# Patient Record
Sex: FEMALE | Race: WHITE | NOT HISPANIC OR LATINO
[De-identification: names, ages, dates, MRNs, and addresses within clinical notes are randomized per-mention and may not be internally consistent; named-entity substitution may affect disease eponyms.]

---

## 2022-02-16 PROBLEM — Z00.00 ENCOUNTER FOR PREVENTIVE HEALTH EXAMINATION: Status: ACTIVE | Noted: 2022-02-16

## 2022-02-22 ENCOUNTER — NON-APPOINTMENT (OUTPATIENT)
Age: 22
End: 2022-02-22

## 2022-02-22 ENCOUNTER — APPOINTMENT (OUTPATIENT)
Dept: COLORECTAL SURGERY | Facility: CLINIC | Age: 22
End: 2022-02-22
Payer: MEDICAID

## 2022-02-22 VITALS
SYSTOLIC BLOOD PRESSURE: 136 MMHG | HEIGHT: 63.5 IN | BODY MASS INDEX: 23.27 KG/M2 | HEART RATE: 93 BPM | WEIGHT: 133 LBS | TEMPERATURE: 98.5 F | DIASTOLIC BLOOD PRESSURE: 73 MMHG

## 2022-02-22 DIAGNOSIS — K58.9 IRRITABLE BOWEL SYNDROME W/OUT DIARRHEA: ICD-10-CM

## 2022-02-22 DIAGNOSIS — D64.9 ANEMIA, UNSPECIFIED: ICD-10-CM

## 2022-02-22 DIAGNOSIS — Z83.3 FAMILY HISTORY OF DIABETES MELLITUS: ICD-10-CM

## 2022-02-22 DIAGNOSIS — K63.5 POLYP OF COLON: ICD-10-CM

## 2022-02-22 DIAGNOSIS — Z86.59 PERSONAL HISTORY OF OTHER MENTAL AND BEHAVIORAL DISORDERS: ICD-10-CM

## 2022-02-22 PROCEDURE — 46600 DIAGNOSTIC ANOSCOPY SPX: CPT

## 2022-02-22 PROCEDURE — 99203 OFFICE O/P NEW LOW 30 MIN: CPT | Mod: 25

## 2022-02-22 NOTE — PHYSICAL EXAM
[FreeTextEntry1] : Medical assistant present for duration of physical examination\par \par General no acute distress, alert and oriented\par Psych calm, responding appropriately to questions\par Nonlabored breathing\par Ambulating without assistance\par \par Anorectal Exam:\par Inspection no erythema, induration or fluctuance, no skin excoriation, posterior midline anal fissure\par MOHINDER nontender, no masses palpated, no blood on gloved finger\par \par Procedure: Anoscopy\par \par Pre procedure Diagnosis: anal fissure\par Post procedure Diagnosis: anal fissure\par Anesthesia: none\par Estimated blood loss: none\par Specimen: none\par Complications: none\par \par Consent obtained. Anoscopy was performed by passing a lighted anoscope with lubricant jelly into the anal canal and the entire anal mucosal surface was inspected. Findings included posterior midline fissure, no internal hemorrhoidal inflammation, no blood or discharge in anal canal\par \par Patient tolerated examination and procedure well.\par \par \par

## 2022-02-22 NOTE — ASSESSMENT
[FreeTextEntry1] : Exam findings and diagnosis were discussed at length with patient. \par \par Patient presents today as a second opinion. Seen previously by a CRS who recommended botox injection.\par \par Importance of continued medical management and supportive care for constipation and pelvic floor dysfunction discussed.\par Management options for anal fissure discussed, including medical management and procedures/surgery.\par Symptom have persisted despite topical diltiazem compound. We discussed alternative topical compounds can be tried, and the possibility of persistent or worsening symptoms.\par We discussed alternative options, such as fissurectomy with botox injection vs lateral partial internal sphincterotomy. \par \par Recommend patient consider fissurectomy with botox injection.\par The nature of the procedure as well as risks and benefits were reviewed with the patient. Risk/benefits/alternatives discussed at length, including but not limited to pain, bleeding, infection, swelling, recurrence of symptoms, need for future surgery, scarring, and risk of alteration of bowel habits, such as seepage, fecal urgency and/or fecal (gas, liquid or solid) incontinence discussed, which may be temporary or permanent. Postprocedural expectations regarding pain, wound cosmesis, and wound healing discussed.  The preoperative, intraoperative, and postoperative management were discussed with the patient in detail. All questions were answered, patient expressed understanding, and would like to proceed with the proposed surgery. Informed consent was obtained. Ambulatory surgery instructions were given to patient\par \par Father was present for discussions at the request of the patient.\par

## 2022-02-22 NOTE — HISTORY OF PRESENT ILLNESS
[FreeTextEntry1] : 22 yo F presents for second opinion of anal fissure\par \par h/o IBS, chronic constipation, anxiety (h/o constipation on oral pill, now w/ Ketamine infusions and Latuda), pelvic floor dysfunction, fibromyalgia, chronic fatigue, generalized weakness (followed by Pain management and Rheum as needed, on Lyrica and meloxicam), followed by physiatrist for pelvic floor dysfunction, s/p PT for over 1 year w/o improvement, nausea (on odansetron, gas x)\par PSH umbilical hernia repair age 7\par h/o anemia s/p iron infusions x 2, last received this past week\par \par h/o chronic constipation x a few years w/ h/o BM every 1-2 weeks w/o laxatives, currently on Miralax 3 servings daily and Linzess w/ BM every 2-3 days and loose. Pt takes Dulcolax as needed, however tries to avoid as she has urgency and sometimes can't make it to the toilet in time\par h/o rectal bleeding every several months in past years\par \par c/o onset of rectal pain 7 weeks ago in setting of constipation and straining w/ sharp cut like pain, worsened after BMs and prolonged sitting. Also has occasional BRB noted in toilet bowl\par \par Tried topical lidocaine 4 % w/o much improvement\par Pt c/o pain to physiatrist who suspected fissure and advised to contact GI. GI started on topical Diltiazem 2%, pt has been using BID for the last 5.5 weeks w/o much improvement\par \par Also seen by JESSICA Balbuena at [facility] who confirmed fissure and recommended Botox\par Pt concerned about risk of sphincter loss of control and presents for alternate opinion\par \par Has tried dietary fiber in the past w/ worsening constipation and nausea, followed by Nutritionist and recommends avoidance of greens. Eats mostly nutritional shakes, soups, pureed foods.\par adequate dietary fiber intake, drinks water. \par \par \par Denies FMH of colorectal cancer. Sister w/ Crohn's disease\par Last colonoscopy 12/28/21 w/ Dr. Vasiliy Cantor at [facility], internal hemorrhoids and 1 hyperplastic polyp noted\par EGD mild gastitis on protonix\par Denies ASA use. takes Meloxicam

## 2022-02-23 ENCOUNTER — TRANSCRIPTION ENCOUNTER (OUTPATIENT)
Age: 22
End: 2022-02-23

## 2022-03-24 ENCOUNTER — TRANSCRIPTION ENCOUNTER (OUTPATIENT)
Age: 22
End: 2022-03-24

## 2022-03-24 NOTE — ASU PATIENT PROFILE, ADULT - NSICDXPASTMEDICALHX_GEN_ALL_CORE_FT
PAST MEDICAL HISTORY:  Acid reflux     Amplified musculoskeletal pain syndrome     Anemia     Asthma     Constipation     H/O fibromyalgia     H/O headache     History of nausea     IBS (irritable bowel syndrome)     Rosacea     Severe anxiety      PAST MEDICAL HISTORY:  Acid reflux     Amplified musculoskeletal pain syndrome     Anemia     Arthralgia of both knees     Asthma     Constipation     H/O fibromyalgia     H/O headache     History of nausea     IBS (irritable bowel syndrome)     Rosacea     Severe anxiety

## 2022-03-24 NOTE — ASU PATIENT PROFILE, ADULT - NSICDXPASTSURGICALHX_GEN_ALL_CORE_FT
PAST SURGICAL HISTORY:  H/O hernia repair     S/P biopsy face    S/P colonoscopy     S/P endoscopy      PAST SURGICAL HISTORY:  H/O hernia repair umbilical    S/P biopsy face    S/P colonoscopy     S/P endoscopy

## 2022-03-25 ENCOUNTER — TRANSCRIPTION ENCOUNTER (OUTPATIENT)
Age: 22
End: 2022-03-25

## 2022-03-25 ENCOUNTER — APPOINTMENT (OUTPATIENT)
Dept: COLORECTAL SURGERY | Facility: CLINIC | Age: 22
End: 2022-03-25

## 2022-03-25 ENCOUNTER — OUTPATIENT (OUTPATIENT)
Dept: OUTPATIENT SERVICES | Facility: HOSPITAL | Age: 22
LOS: 1 days | Discharge: HOME | End: 2022-03-25
Payer: MEDICAID

## 2022-03-25 ENCOUNTER — RESULT REVIEW (OUTPATIENT)
Age: 22
End: 2022-03-25

## 2022-03-25 VITALS
HEIGHT: 63.5 IN | TEMPERATURE: 98 F | RESPIRATION RATE: 16 BRPM | DIASTOLIC BLOOD PRESSURE: 76 MMHG | WEIGHT: 128.53 LBS | HEART RATE: 83 BPM | SYSTOLIC BLOOD PRESSURE: 124 MMHG

## 2022-03-25 VITALS
HEART RATE: 72 BPM | RESPIRATION RATE: 16 BRPM | SYSTOLIC BLOOD PRESSURE: 118 MMHG | TEMPERATURE: 98 F | DIASTOLIC BLOOD PRESSURE: 65 MMHG

## 2022-03-25 DIAGNOSIS — Z98.890 OTHER SPECIFIED POSTPROCEDURAL STATES: Chronic | ICD-10-CM

## 2022-03-25 PROCEDURE — 88304 TISSUE EXAM BY PATHOLOGIST: CPT | Mod: 26

## 2022-03-25 PROCEDURE — 46200 REMOVAL OF ANAL FISSURE: CPT | Mod: GC

## 2022-03-25 PROCEDURE — 46505 CHEMODENERVATION ANAL MUSC: CPT | Mod: GC

## 2022-03-25 RX ORDER — SODIUM CHLORIDE 9 MG/ML
1000 INJECTION, SOLUTION INTRAVENOUS
Refills: 0 | Status: DISCONTINUED | OUTPATIENT
Start: 2022-03-25 | End: 2022-03-25

## 2022-03-25 RX ORDER — OXYCODONE AND ACETAMINOPHEN 5; 325 MG/1; MG/1
1 TABLET ORAL
Qty: 15 | Refills: 0
Start: 2022-03-25

## 2022-03-25 RX ORDER — DOCUSATE SODIUM 100 MG
1 CAPSULE ORAL
Qty: 7 | Refills: 0
Start: 2022-03-25 | End: 2022-03-31

## 2022-03-25 RX ORDER — OXYCODONE HYDROCHLORIDE 5 MG/1
5 TABLET ORAL ONCE
Refills: 0 | Status: DISCONTINUED | OUTPATIENT
Start: 2022-03-25 | End: 2022-03-25

## 2022-03-25 NOTE — ASU DISCHARGE PLAN (ADULT/PEDIATRIC) - NS MD DC FALL RISK RISK
For information on Fall & Injury Prevention, visit: [email or web address] OR  [email or web address] OR  https://www.cdc.gov/steadi/patient.html

## 2022-03-25 NOTE — BRIEF OPERATIVE NOTE - NSICDXBRIEFPROCEDURE_GEN_ALL_CORE_FT
PROCEDURES:  Excision, fissure or fistula, anus 25-Mar-2022 06:56:36  Rishi Holguin  Injection of Botox into anus 25-Mar-2022 06:56:43  Rishi Holguin

## 2022-03-25 NOTE — ASU DISCHARGE PLAN (ADULT/PEDIATRIC) - CARE PROVIDER_API CALL
Marquita Marquez)  ColonRectal Surgery; Surgery  [address] [facility]nd [address]  [place], [place] [address]  Phone: [phone]  Fax: [phone]  Follow Up Time: 1 month

## 2022-03-25 NOTE — BRIEF OPERATIVE NOTE - OPERATION/FINDINGS
INCOMPLETE Perianal block. Excision of fissure with sharp dissection. Hemostasis with bipolar cautery. Curreatage of fissure base. Botox injected around and within fissure bed. Excellent hemostasis.

## 2022-03-25 NOTE — ASU DISCHARGE PLAN (ADULT/PEDIATRIC) - ASU DC SPECIAL INSTRUCTIONSFT
-Please take Colace (Docusate) 100 mg twice a day. Take Percocet if needed for pain.  -Please perform Sitz baths (no Epsom salts or additional additives), ~20 minutes two or three times a day and after bowel movements.  -Avoiding straining with BM’s and maintaining adequate hydration and fiber intake  -Drink 6-10 glasses of water daily and eat a mix of fruits, vegetables and whole grains  -Can take fiber supplement in addition to dietary fiber but only provides 1/3 (10mg) of needed daily intake  -Please call the office to schedule an appointment 3-4 weeks after surgery. The office number is listed below.

## 2022-03-27 ENCOUNTER — EMERGENCY (EMERGENCY)
Facility: HOSPITAL | Age: 22
LOS: 1 days | Discharge: HOME | End: 2022-03-27
Attending: EMERGENCY MEDICINE | Admitting: EMERGENCY MEDICINE
Payer: COMMERCIAL

## 2022-03-27 VITALS
SYSTOLIC BLOOD PRESSURE: 131 MMHG | DIASTOLIC BLOOD PRESSURE: 84 MMHG | RESPIRATION RATE: 18 BRPM | HEIGHT: 63.5 IN | OXYGEN SATURATION: 98 % | HEART RATE: 94 BPM | TEMPERATURE: 99 F | WEIGHT: 130.07 LBS

## 2022-03-27 DIAGNOSIS — Z98.890 OTHER SPECIFIED POSTPROCEDURAL STATES: Chronic | ICD-10-CM

## 2022-03-27 DIAGNOSIS — R33.9 RETENTION OF URINE, UNSPECIFIED: ICD-10-CM

## 2022-03-27 PROBLEM — F41.9 ANXIETY DISORDER, UNSPECIFIED: Chronic | Status: ACTIVE | Noted: 2022-03-24

## 2022-03-27 PROBLEM — J45.909 UNSPECIFIED ASTHMA, UNCOMPLICATED: Chronic | Status: ACTIVE | Noted: 2022-03-24

## 2022-03-27 PROBLEM — M25.561 PAIN IN RIGHT KNEE: Chronic | Status: ACTIVE | Noted: 2022-03-25

## 2022-03-27 PROBLEM — Z87.898 PERSONAL HISTORY OF OTHER SPECIFIED CONDITIONS: Chronic | Status: ACTIVE | Noted: 2022-03-24

## 2022-03-27 PROBLEM — K58.9 IRRITABLE BOWEL SYNDROME WITHOUT DIARRHEA: Chronic | Status: ACTIVE | Noted: 2022-03-24

## 2022-03-27 PROBLEM — Z87.39 PERSONAL HISTORY OF OTHER DISEASES OF THE MUSCULOSKELETAL SYSTEM AND CONNECTIVE TISSUE: Chronic | Status: ACTIVE | Noted: 2022-03-24

## 2022-03-27 PROBLEM — M79.18 MYALGIA, OTHER SITE: Chronic | Status: ACTIVE | Noted: 2022-03-24

## 2022-03-27 PROBLEM — K59.00 CONSTIPATION, UNSPECIFIED: Chronic | Status: ACTIVE | Noted: 2022-03-24

## 2022-03-27 PROBLEM — K21.9 GASTRO-ESOPHAGEAL REFLUX DISEASE WITHOUT ESOPHAGITIS: Chronic | Status: ACTIVE | Noted: 2022-03-24

## 2022-03-27 PROBLEM — L71.9 ROSACEA, UNSPECIFIED: Chronic | Status: ACTIVE | Noted: 2022-03-24

## 2022-03-27 PROBLEM — K58.9 IRRITABLE BOWEL SYNDROME, UNSPECIFIED: Chronic | Status: ACTIVE | Noted: 2022-03-24

## 2022-03-27 PROBLEM — D64.9 ANEMIA, UNSPECIFIED: Chronic | Status: ACTIVE | Noted: 2022-03-24

## 2022-03-27 LAB
ALBUMIN SERPL ELPH-MCNC: 5 G/DL — SIGNIFICANT CHANGE UP (ref 3.3–5)
ALP SERPL-CCNC: 43 U/L — SIGNIFICANT CHANGE UP (ref 40–120)
ALT FLD-CCNC: 11 U/L — SIGNIFICANT CHANGE UP (ref 10–45)
ANION GAP SERPL CALC-SCNC: 11 MMOL/L — SIGNIFICANT CHANGE UP (ref 5–17)
APPEARANCE UR: CLEAR — SIGNIFICANT CHANGE UP
AST SERPL-CCNC: 16 U/L — SIGNIFICANT CHANGE UP (ref 10–40)
BILIRUB SERPL-MCNC: 0.4 MG/DL — SIGNIFICANT CHANGE UP (ref 0.2–1.2)
BILIRUB UR-MCNC: NEGATIVE — SIGNIFICANT CHANGE UP
BUN SERPL-MCNC: 9 MG/DL — SIGNIFICANT CHANGE UP (ref 7–23)
CALCIUM SERPL-MCNC: 9.6 MG/DL — SIGNIFICANT CHANGE UP (ref 8.4–10.5)
CHLORIDE SERPL-SCNC: 104 MMOL/L — SIGNIFICANT CHANGE UP (ref 96–108)
CO2 SERPL-SCNC: 27 MMOL/L — SIGNIFICANT CHANGE UP (ref 22–31)
COLOR SPEC: YELLOW — SIGNIFICANT CHANGE UP
CREAT SERPL-MCNC: 0.85 MG/DL — SIGNIFICANT CHANGE UP (ref 0.5–1.3)
DIFF PNL FLD: NEGATIVE — SIGNIFICANT CHANGE UP
EGFR: 99 ML/MIN/1.73M2 — SIGNIFICANT CHANGE UP
GLUCOSE SERPL-MCNC: 93 MG/DL — SIGNIFICANT CHANGE UP (ref 70–99)
GLUCOSE UR QL: NEGATIVE — SIGNIFICANT CHANGE UP
HCT VFR BLD CALC: 44.6 % — SIGNIFICANT CHANGE UP (ref 34.5–45)
HGB BLD-MCNC: 14.5 G/DL — SIGNIFICANT CHANGE UP (ref 11.5–15.5)
KETONES UR-MCNC: NEGATIVE — SIGNIFICANT CHANGE UP
LEUKOCYTE ESTERASE UR-ACNC: NEGATIVE — SIGNIFICANT CHANGE UP
MCHC RBC-ENTMCNC: 29.4 PG — SIGNIFICANT CHANGE UP (ref 27–34)
MCHC RBC-ENTMCNC: 32.5 GM/DL — SIGNIFICANT CHANGE UP (ref 32–36)
MCV RBC AUTO: 90.3 FL — SIGNIFICANT CHANGE UP (ref 80–100)
NITRITE UR-MCNC: NEGATIVE — SIGNIFICANT CHANGE UP
NRBC # BLD: 0 /100 WBCS — SIGNIFICANT CHANGE UP (ref 0–0)
PH UR: 8 — SIGNIFICANT CHANGE UP (ref 5–8)
PLATELET # BLD AUTO: 291 K/UL — SIGNIFICANT CHANGE UP (ref 150–400)
POTASSIUM SERPL-MCNC: 4.1 MMOL/L — SIGNIFICANT CHANGE UP (ref 3.5–5.3)
POTASSIUM SERPL-SCNC: 4.1 MMOL/L — SIGNIFICANT CHANGE UP (ref 3.5–5.3)
PROT SERPL-MCNC: 7.5 G/DL — SIGNIFICANT CHANGE UP (ref 6–8.3)
PROT UR-MCNC: NEGATIVE MG/DL — SIGNIFICANT CHANGE UP
RBC # BLD: 4.94 M/UL — SIGNIFICANT CHANGE UP (ref 3.8–5.2)
RBC # FLD: 12.7 % — SIGNIFICANT CHANGE UP (ref 10.3–14.5)
SODIUM SERPL-SCNC: 142 MMOL/L — SIGNIFICANT CHANGE UP (ref 135–145)
SP GR SPEC: 1.01 — SIGNIFICANT CHANGE UP (ref 1–1.03)
UROBILINOGEN FLD QL: 0.2 E.U./DL — SIGNIFICANT CHANGE UP
WBC # BLD: 5.81 K/UL — SIGNIFICANT CHANGE UP (ref 3.8–10.5)
WBC # FLD AUTO: 5.81 K/UL — SIGNIFICANT CHANGE UP (ref 3.8–10.5)

## 2022-03-27 PROCEDURE — 85027 COMPLETE CBC AUTOMATED: CPT

## 2022-03-27 PROCEDURE — 51702 INSERT TEMP BLADDER CATH: CPT

## 2022-03-27 PROCEDURE — 99284 EMERGENCY DEPT VISIT MOD MDM: CPT | Mod: 25

## 2022-03-27 PROCEDURE — 74177 CT ABD & PELVIS W/CONTRAST: CPT | Mod: 26,MA

## 2022-03-27 PROCEDURE — 88304 TISSUE EXAM BY PATHOLOGIST: CPT

## 2022-03-27 PROCEDURE — 96365 THER/PROPH/DIAG IV INF INIT: CPT | Mod: XU

## 2022-03-27 PROCEDURE — 99285 EMERGENCY DEPT VISIT HI MDM: CPT

## 2022-03-27 PROCEDURE — 81003 URINALYSIS AUTO W/O SCOPE: CPT

## 2022-03-27 PROCEDURE — 80053 COMPREHEN METABOLIC PANEL: CPT

## 2022-03-27 PROCEDURE — 74177 CT ABD & PELVIS W/CONTRAST: CPT | Mod: MA

## 2022-03-27 PROCEDURE — 36415 COLL VENOUS BLD VENIPUNCTURE: CPT

## 2022-03-27 RX ORDER — ACETAMINOPHEN 500 MG
1000 TABLET ORAL ONCE
Refills: 0 | Status: COMPLETED | OUTPATIENT
Start: 2022-03-27 | End: 2022-03-27

## 2022-03-27 RX ORDER — OXYBUTYNIN CHLORIDE 5 MG
5 TABLET ORAL ONCE
Refills: 0 | Status: COMPLETED | OUTPATIENT
Start: 2022-03-27 | End: 2022-03-27

## 2022-03-27 RX ADMIN — Medication 1000 MILLIGRAM(S): at 14:00

## 2022-03-27 RX ADMIN — Medication 400 MILLIGRAM(S): at 13:39

## 2022-03-27 RX ADMIN — Medication 5 MILLIGRAM(S): at 14:56

## 2022-03-27 NOTE — ED ADULT NURSE NOTE - NSICDXPASTMEDICALHX_GEN_ALL_CORE_FT
PAST MEDICAL HISTORY:  Acid reflux     Amplified musculoskeletal pain syndrome     Anemia     Arthralgia of both knees     Asthma     Constipation     H/O fibromyalgia     H/O headache     History of nausea     IBS (irritable bowel syndrome)     Rosacea     Severe anxiety

## 2022-03-27 NOTE — ED PROVIDER NOTE - CLINICAL SUMMARY MEDICAL DECISION MAKING FREE TEXT BOX
Patient presents to ED with concern for rectal discomfort and pain to site of chung catheter following anal fissure repair with Dr. Marquez 3/25.  Patient non toxic, in NAD on arrival to ED.  Chung draining yellow, clear urine.  Abd soft. NT/ND.  Labs sent and surgery team consulted to evaluate patient. Patient presents to ED with concern for rectal discomfort and pain to site of chung catheter following anal fissure repair with Dr. Marquez 3/25.  Patient non toxic, in NAD on arrival to ED.  Chung draining yellow, clear urine.  Abd soft. NT/ND.  Labs sent and surgery team consulted to evaluate patient.  Gen sx requesting CT abd/pel w IV contrast and trial chung removal with single dose of oxybutynin.  CT and labs WNL.  Patient is aware of all results.  Chung removed and patient unable to void following several hour wait / IVF, po fluid, etc.  Urology team contacted recommending replacement of chung and outpatient urology follow up this week.  Chung is replaced and patient is aware of recommendation for follow up with both general surgery and urology teams.  Patient is advised to follow up with J Luis Marquez and Evangelista (urologist, information provided) in 1-2 days without fail.  Patient instructed to return to ED immediately should their symptoms worsen or if there is any concern prior to the recommended follow up.  Patient is aware of plan and verbalizes their understanding.  Will discharge home at this time.

## 2022-03-27 NOTE — CONSULT NOTE ADULT - SUBJECTIVE AND OBJECTIVE BOX
This is a 21 year old female with history of an anal fissure, she is s/p a  recent fissure repair with Botox injections.  She  presented  to ED with concern of rectal pain and discomfort from a Pascal catheter that was placed yesterday in another ER ([facility] in [place]) that she had gone to after experiencing urinary retention post op.   On ED arrival,  the patients Pascal was draining clear yellow urine.  She denied any associated fever, chills, headache, visual changes, chest pain, shortness of breath, abdominal pain, nausea, emesis, changes to bowel movements, peripheral edema, calf pain/tenderness, recent travel, known sick contacts or any additional acute complaints or concerns at this time.  Patient states she has been taking Lyrica as prescribed for her history of chronic pain.    Once in the ER her Pascal was removed and she was unable to void again.  Staff attempted but was unable to place a new Pascal, so Urology was called    PAST MEDICAL & SURGICAL HISTORY:  Severe anxiety  H/O headache  Asthma  Acid reflux  H/O fibromyalgia  Anemia  Constipation  History of nausea  IBS (irritable bowel syndrome)  Rosacea  Amplified musculoskeletal pain syndrome  Arthralgia of both knees  H/O hernia repair  umbilical  S/P endoscopy  S/P colonoscopy  S/P biopsy face    Home Medications:  albuterol: 2.5 milligram(s) by nebulizer prn (25 Mar 2022 07:40)  azelaic acid 15% topical gel: Apply topically to affected area 2 times a day (25 Mar 2022 07:40)  cyproheptadine 4 mg oral tablet: 4 milligram(s) orally 2 times a day (25 Mar 2022 07:40)  diazePAM 5 mg rectal kit: 5 milligram(s) rectal prn (25 Mar 2022 07:40)  ketamine: 1 implant(s) intravenous every 3 months (25 Mar 2022 07:40)  Latuda 20 mg oral tablet: 1 tab(s) orally once a day (25 Mar 2022 07:40)  Linzess 290 mcg oral capsule: 1 cap(s) orally once a day (25 Mar 2022 07:40)  Lyrica 300 mg oral capsule: 300 milligram(s) orally 3 times a day (25 Mar 2022 07:40)  meloxicam 15 mg oral tablet: 1 tab(s) orally once a day (25 Mar 2022 07:40)  MiraLax: 17 gram(s) orally 3 times a day (25 Mar 2022 07:40)  pantoprazole 40 mg oral delayed release tablet: 1 tab(s) orally once a day (25 Mar 2022 07:40)  simethicone 250 mg oral capsule: 250 milligram(s) orally 2 times a day (25 Mar 2022 07:40)  Xanax 0.5 mg oral tablet: 0.5 milligram(s) orally prn (25 Mar 2022 07:40)  Zofran ODT 8 mg oral tablet, disintegratin tab(s) orally 3 times a day (25 Mar 2022 07:40)                          14.5   5.81  )-----------( 291      ( 27 Mar 2022 13:34 )             44.6   03-27    142  |  104  |  9   ----------------------------<  93  4.1   |  27  |  0.85    Ca    9.6      27 Mar 2022 13:34    TPro  7.5  /  Alb  5.0  /  TBili  0.4  /  DBili  x   /  AST  16  /  ALT  11  /  AlkPhos  43  03-    Urinalysis Basic - ( 27 Mar 2022 12:57 )    Color: Yellow / Appearance: Clear / S.010 / pH: x  Gluc: x / Ketone: NEGATIVE  / Bili: Negative / Urobili: 0.2 E.U./dL   Blood: x / Protein: NEGATIVE mg/dL / Nitrite: NEGATIVE   Leuk Esterase: NEGATIVE / RBC: x / WBC x   Sq Epi: x / Non Sq Epi: x / Bacteria: x

## 2022-03-27 NOTE — ED PROVIDER NOTE - NSFOLLOWUPINSTRUCTIONS_ED_ALL_ED_FT
Please follow up with Dr. Marquez (general surgeon) and Dr. Naidu (urologist, information provided to you) in 1-2 days for re evaluation.  Please return to ER immediately should your symptoms worsen or if you have any concern prior to this recommended follow up.      Pascal Catheter Placement and Care    WHAT YOU NEED TO KNOW:    A Pascal catheter is a sterile tube that is inserted into your bladder to drain urine. It is also called an indwelling urinary catheter.     DISCHARGE INSTRUCTIONS:    Return to the emergency department if:   •Your catheter comes out.       •You suddenly have material that looks like sand in the tubing or drainage bag.      •No urine is draining into the bag and you have checked the system.      •You have pain in your hip, back, pelvis, or lower abdomen.      •You are confused or cannot think clearly.      Call your doctor or urologist if:   •You have a fever.      •You have bladder spasms for more than 1 day after the catheter is placed.      •You see blood in the tubing or drainage bag.      •You have a rash or itching where the catheter tube is secured to your skin.      •Urine leaks from or around the catheter, tubing, or drainage bag.      •The closed drainage system has accidently come open or apart.       •You see a layer of crystals inside the tubing.      •You have questions or concerns about your condition or care.      Care for your catheter and drainage bag: You can reduce your risk for infection and injury by caring for your catheter and drainage bag properly.  •Wash your hands often. Wash before and after you touch your catheter, tubing, or drainage bag. Use soap and water. Wear clean disposable gloves when you care for your catheter or disconnect the drainage bag. Wash your hands before you prepare or eat food.   Handwashing           •Clean your genital area 2 times every day. Clean your catheter area and anal opening after every bowel movement. ?For men: Use a soapy cloth to clean the tip of your penis. Start where the catheter enters. Wipe backward making sure to pull back the foreskin. Then use a cloth with clear water in the same direction to clean away the soap.       ?For women: Use a soapy cloth to clean the area that the catheter enters your body. Make sure to separate your labia and wipe toward the anus. Then use a cloth with clear water and wipe in the same direction.       •Secure the catheter tube so you do not pull or move the catheter. This helps prevent pain and bladder spasms. Healthcare providers will show you how to use medical tape or a strap to secure the catheter tube to your body.       •Keep a closed drainage system. Your catheter should always be attached to the drainage bag to form a closed system. Do not disconnect any part of the closed system unless you need to change the bag.      •Keep the drainage bag below the level of your waist. This helps stop urine from moving back up the tubing and into your bladder. Do not loop or kink the tubing. This can cause urine to back up and collect in your bladder. Do not let the drainage bag touch or lie on the floor.      •Empty the drainage bag when needed. The weight of a full drainage bag can be painful. Empty the drainage bag every 3 to 6 hours or when it is ? full.       •Clean and change the drainage bag as directed. Ask your healthcare provider how often you should change the drainage bag and what cleaning solution to use. Wear disposable gloves when you change the bag. Do not allow the end of the catheter or tubing to touch anything. Clean the ends with an alcohol pad before you reconnect them.      What to do if problems develop:   •No urine is draining into the bag: ?Check for kinks in the tubing and straighten them out.       ?Check the tape or strap used to secure the catheter tube to your skin. Make sure it is not blocking the tube.       ?Make sure you are not sitting or lying on the tubing.      ?Make sure the urine bag is hanging below the level of your waist.      •Urine leaks from or around the catheter, tubing, or drainage bag: Check if the closed drainage system has accidently come open or apart. Clean the catheter and tubing ends with a new alcohol pad and reconnect them.       Follow up with your doctor or urologist as directed: Write down your questions so you remember to ask them during your visits.        © Copyright [employer] 2022           back to top                          © Copyright [employer] 2022

## 2022-03-27 NOTE — ED ADULT TRIAGE NOTE - OTHER COMPLAINTS
s/p indwelling urinary cath placed on friday after botox surgery endorsing pain to site with burning.

## 2022-03-27 NOTE — ED PROVIDER NOTE - PHYSICAL EXAMINATION
VITAL SIGNS: I have reviewed nursing notes and confirm.  CONSTITUTIONAL: Non toxic; in no acute distress.   SKIN:  Warm and dry, no acute rash.   HEAD:  Normocephalic, atraumatic.  EYES: PERRL.  EOM intact; conjunctiva and sclera clear.  ENT: No nasal discharge; airway clear.   NECK: Supple; non tender.  CARD: S1, S2 normal; no murmurs, gallops, or rubs. Regular rate and rhythm.   RESP:  Clear to auscultation b/l, no wheezes, rales or rhonchi.  ABD: Normal bowel sounds; soft; non-distended; non-tender; no guarding/rebound.  + External, non thrombosed hemorrhoid, no bleeding.  :  Pascal catheter in place draining clear yellow urine.    EXT: Normal ROM. No clubbing, cyanosis or edema. 2+ pulses to b/l ue/le.  NEURO: Alert, oriented, grossly unremarkable.  5/5 strength x 4 extremities against gravity and external force.  No drift x 4 extremities.  Sensation intact and symmetric x 4 extremities.  No facial asymmetry.    PSYCH: Cooperative, mood and affect appropriate.

## 2022-03-27 NOTE — ED ADULT NURSE NOTE - NSICDXPASTSURGICALHX_GEN_ALL_CORE_FT
PAST SURGICAL HISTORY:  H/O hernia repair umbilical    S/P biopsy face    S/P colonoscopy     S/P endoscopy

## 2022-03-27 NOTE — ED PROVIDER NOTE - CARE PROVIDER_API CALL
Miguelito Naidu)  Urology  [address] [address]  Phone: [phone]  Fax: [phone]  Follow Up Time:     Marquita Marquez)  ColonRectal Surgery; Surgery  [address] [facility] [place] [address]  Phone: [phone]  Fax: [phone]  Follow Up Time:

## 2022-03-27 NOTE — CONSULT NOTE ADULT - SUBJECTIVE AND OBJECTIVE BOX
HPI: 21 year old female with history of anal fissure s/p recent fissure repair and botox injection presents to ED with concern for rectal pain and discomfort to site of chung catheter.  Patient states she was having some urinary retention following her surgery 3/25 was sent to another area ED where she had a chung catheter placed.  On ED arrival, patient's chung is draining clear yellow urine.  Patient denies associated fever, chills, headache, visual changes, chest pain, shortness of breath, abdominal pain, nausea, emesis, changes to bowel movements, peripheral edema, calf pain/tenderness, recent travel, known sick contacts or any additional acute complaints or concerns at this time.  Patient states she has been taking Lyrica as prescribed for her history of chronic pain.      SURGICAL ADDENDUM: 20yo F pt s/p fissurectomy and botox injections on 3/25 with Dr. Marquez presents with rectal pain x1 day. Reports pain has become more severe today but is more concerned with discomfort associated with chung catheter placed at [facility] ED on 3/26. Surgery consulted for rectal pain. Pt endorsing moderate rectal pain and trace blood with bowel movement last night. Also states that her BM was hard with difficulty voiding.     PAST MEDICAL & SURGICAL HISTORY:  Severe anxiety    H/O headache    Asthma    Acid reflux    H/O fibromyalgia    Anemia    Constipation    History of nausea    IBS (irritable bowel syndrome)    Rosacea    Amplified musculoskeletal pain syndrome    Arthralgia of both knees    H/O hernia repair  umbilical    S/P endoscopy    S/P colonoscopy    S/P biopsy  face        MEDICATIONS  (STANDING):    MEDICATIONS  (PRN):      Allergies    amitriptyline (Other)  ceftriaxone (Rash)  clindamycin (Vomiting)  doxycycline (Vomiting; Nausea)  fish (Nausea)  fluoxetine (Other)  lactose (Other)  Lamictal (Rash)  Seroquel (Unknown)  sertraline (Other)  Vraylar (Vomiting)    Intolerances        SOCIAL HISTORY:    FAMILY HISTORY:      Vital Signs Last 24 Hrs  T(C): 37.3 (27 Mar 2022 12:17), Max: 37.3 (27 Mar 2022 12:17)  T(F): 99.1 (27 Mar 2022 12:17), Max: 99.1 (27 Mar 2022 12:17)  HR: 94 (27 Mar 2022 12:17) (94 - 94)  BP: 131/84 (27 Mar 2022 12:17) (131/84 - 131/84)  BP(mean): --  RR: 18 (27 Mar 2022 12:17) (18 - 18)  SpO2: 98% (27 Mar 2022 12:17) (98% - 98%)    PHYSICAL EXAM      LABS:                        14.5   5.81  )-----------( 291      ( 27 Mar 2022 13:34 )             44.6     03    142  |  104  |  9   ----------------------------<  93  4.1   |  27  |  0.85    Ca    9.6      27 Mar 2022 13:34    TPro  7.5  /  Alb  5.0  /  TBili  0.4  /  DBili  x   /  AST  16  /  ALT  11  /  AlkPhos  43        Urinalysis Basic - ( 27 Mar 2022 12:57 )    Color: Yellow / Appearance: Clear / S.010 / pH: x  Gluc: x / Ketone: NEGATIVE  / Bili: Negative / Urobili: 0.2 E.U./dL   Blood: x / Protein: NEGATIVE mg/dL / Nitrite: NEGATIVE   Leuk Esterase: NEGATIVE / RBC: x / WBC x   Sq Epi: x / Non Sq Epi: x / Bacteria: x        RADIOLOGY & ADDITIONAL STUDIES: HPI: 21 year old female with history of anal fissure s/p recent fissure repair and botox injection presents to ED with concern for rectal pain and discomfort to site of chung catheter.  Patient states she was having some urinary retention following her surgery 3/25 was sent to another area ED where she had a chung catheter placed.  On ED arrival, patient's chung is draining clear yellow urine.  Patient denies associated fever, chills, headache, visual changes, chest pain, shortness of breath, abdominal pain, nausea, emesis, changes to bowel movements, peripheral edema, calf pain/tenderness, recent travel, known sick contacts or any additional acute complaints or concerns at this time.  Patient states she has been taking Lyrica as prescribed for her history of chronic pain.      SURGICAL ADDENDUM: 20yo F pt s/p fissurectomy and botox injections on 3/25 with Dr. Marquez presents with rectal pain x1 day. Reports pain has become more severe today but is more concerned with discomfort associated with chung catheter placed at [facility] ED on 3/26. Surgery consulted for rectal pain. On presentation, pt endorsing moderate rectal pain and trace blood with bowel movement last night. Also states that her BM was hard with difficulty voiding.     PAST MEDICAL & SURGICAL HISTORY:  Severe anxiety    H/O headache    Asthma    Acid reflux    H/O fibromyalgia    Anemia    Constipation    History of nausea    IBS (irritable bowel syndrome)    Rosacea    Amplified musculoskeletal pain syndrome    Arthralgia of both knees    H/O hernia repair  umbilical    S/P endoscopy    S/P colonoscopy    S/P biopsy  face        MEDICATIONS  (STANDING):    MEDICATIONS  (PRN):      Allergies    amitriptyline (Other)  ceftriaxone (Rash)  clindamycin (Vomiting)  doxycycline (Vomiting; Nausea)  fish (Nausea)  fluoxetine (Other)  lactose (Other)  Lamictal (Rash)  Seroquel (Unknown)  sertraline (Other)  Vraylar (Vomiting)    Intolerances        SOCIAL HISTORY:    FAMILY HISTORY:      Vital Signs Last 24 Hrs  T(C): 37.3 (27 Mar 2022 12:17), Max: 37.3 (27 Mar 2022 12:17)  T(F): 99.1 (27 Mar 2022 12:17), Max: 99.1 (27 Mar 2022 12:17)  HR: 94 (27 Mar 2022 12:17) (94 - 94)  BP: 131/84 (27 Mar 2022 12:17) (131/84 - 131/84)  BP(mean): --  RR: 18 (27 Mar 2022 12:17) (18 - 18)  SpO2: 98% (27 Mar 2022 12:17) (98% - 98%)    PHYSICAL EXAM  General: AAOx3, NAD, WDWN, laying comfortably in bed  Cardio: S1,S2, No MRG, RRR  Pulm: Lungs bilaterally clear to auscultation  Abdomen: soft, NTND, no rebound, no guarding  Rectum: Large skin tag noted at posterior midline, no erythema or edema, MOHINDER with normal sphincter tone, no palpable masses, no evidence of bleeding, mild tenderness, well tolerated  Extremities: WWP, peripheral pulses appreciated    LABS:                        14.5   5.81  )-----------( 291      ( 27 Mar 2022 13:34 )             44.6         142  |  104  |  9   ----------------------------<  93  4.1   |  27  |  0.85    Ca    9.6      27 Mar 2022 13:34    TPro  7.5  /  Alb  5.0  /  TBili  0.4  /  DBili  x   /  AST  16  /  ALT  11  /  AlkPhos  43        Urinalysis Basic - ( 27 Mar 2022 12:57 )    Color: Yellow / Appearance: Clear / S.010 / pH: x  Gluc: x / Ketone: NEGATIVE  / Bili: Negative / Urobili: 0.2 E.U./dL   Blood: x / Protein: NEGATIVE mg/dL / Nitrite: NEGATIVE   Leuk Esterase: NEGATIVE / RBC: x / WBC x   Sq Epi: x / Non Sq Epi: x / Bacteria: x        RADIOLOGY & ADDITIONAL STUDIES:

## 2022-03-27 NOTE — ED ADULT NURSE REASSESSMENT NOTE - NS ED NURSE REASSESS COMMENT FT1
1/hr s/p ditropan and chung removal pt unable to urinate. MD Suarez made aware. Pt given PO fluids, plan for pt to reattempt urinating in 30 mins.

## 2022-03-27 NOTE — CONSULT NOTE ADULT - ASSESSMENT
for recommendations on bladder spasms  F/u with urology for TOV 20yo F pt with PMH of IBS, constipation, fibromyalgia, and anal fissure with failed medical management, now s/p fissurectomy and botox injections with Dr. Marquez on 3/25, presents to the ED with increasing rectal pain and vaginal discomfort associated with chung catheter placement likely 2/2 bladder spasms. Pt afebrile, HDS, with normal WBC and negative U/A. MOHINDER well tolerated with mild pain on digit insertion and no evidence of bleeding. CTAP in the ED demonstrates no anal abnormalities.     Recommendations  No acute surgical intervention at this time  Recommend Urology consult for TOV/management of bladder spasms  Rest of care per ED    Discussed with chief resident and attending on call

## 2022-03-27 NOTE — ED ADULT NURSE REASSESSMENT NOTE - NS ED NURSE REASSESS COMMENT FT1
pt assessed, pt educated on chung catheter care, pt return demonstration to empty chung bag and the importance to chung care.

## 2022-03-27 NOTE — CONSULT NOTE ADULT - ASSESSMENT
22 yo female with urinary retention.  12 Fr Pascal catheter placed.  Patient should be sent home with Pascal attached to a leg bag and follow up with a urologist this week.

## 2022-03-27 NOTE — ED ADULT NURSE NOTE - OBJECTIVE STATEMENT
22 y/o female c/o chung catheter pain, and rectal pain. Pt had fissure repair surgery Friday morning and had chung placed Friday evening due to urinary retention. Chung draining well, pt denies fever, n/v/d.

## 2022-03-27 NOTE — ED PROVIDER NOTE - OBJECTIVE STATEMENT
21 year old female with history of anal fissure s/p recent fissure repair and botox injection presents to ED with concern for rectal pain and discomfort to site of chung catheter.  Patient states she was having some urinary retention following her surgery 3/25 was went to another area ED where she had a chung catheter placed.  On ED arrival, patient's chung is draining clear yellow urine.  Patient denies associated fever, chills, headache, visual changes, chest pain, shortness of breath, abdominal pain, nausea, emesis, changes to bowel movements, peripheral edema, calf pain/tenderness, recent travel, known sick contacts or any additional acute complaints or concerns at this time.  Patient states she has been taking Lyrica as prescribed for her history of chronic pain.

## 2022-03-27 NOTE — ED PROVIDER NOTE - PATIENT PORTAL LINK FT
You can access the FollowMyHealth Patient Portal offered by [facility] by registering at the following website: [email or web address]. By joining [facility]’s FollowMyHealth portal, you will also be able to view your health information using other applications (apps) compatible with our system.

## 2022-03-27 NOTE — ED ADULT NURSE NOTE - NSIMPLEMENTINTERV_GEN_ALL_ED
Implemented All Universal Safety Interventions:  [place] to call system. Call bell, personal items and telephone within reach. Instruct patient to call for assistance. Room bathroom lighting operational. Non-slip footwear when patient is off stretcher. Physically safe environment: no spills, clutter or unnecessary equipment. Stretcher in lowest position, wheels locked, appropriate side rails in place.

## 2022-03-27 NOTE — ED PROVIDER NOTE - NS ED ROS FT
Constitutional: No fever or chills.   Eyes: No pain, blurry vision, or discharge.  ENMT: No hearing changes, pain, discharge or infections. No neck pain or stiffness.  Cardiac: No chest pain, SOB or edema. No chest pain with exertion.  Respiratory: No cough or respiratory distress. No hemoptysis. No history of asthma or RAD.  GI: No nausea, vomiting, diarrhea or abdominal pain. + Rectal pain s/p fissure repair surgery.  : + Pain at chung catheter site.  No dysuria, frequency or burning.  MS: No myalgia, muscle weakness, joint pain or back pain.  Neuro: No headache or weakness. No LOC.  Skin: No skin rash.   Endocrine: No history of thyroid disease or diabetes.  Except as documented in the HPI, all other systems are negative.

## 2022-03-28 ENCOUNTER — NON-APPOINTMENT (OUTPATIENT)
Age: 22
End: 2022-03-28

## 2022-03-28 ENCOUNTER — APPOINTMENT (OUTPATIENT)
Dept: UROLOGY | Facility: CLINIC | Age: 22
End: 2022-03-28
Payer: MEDICAID

## 2022-03-28 PROCEDURE — 99204 OFFICE O/P NEW MOD 45 MIN: CPT

## 2022-03-29 ENCOUNTER — APPOINTMENT (OUTPATIENT)
Dept: UROLOGY | Facility: CLINIC | Age: 22
End: 2022-03-29
Payer: MEDICAID

## 2022-03-29 VITALS — DIASTOLIC BLOOD PRESSURE: 84 MMHG | TEMPERATURE: 98.4 F | SYSTOLIC BLOOD PRESSURE: 119 MMHG | HEART RATE: 144 BPM

## 2022-03-29 DIAGNOSIS — N76.89 OTHER SPECIFIED INFLAMMATION OF VAGINA AND VULVA: ICD-10-CM

## 2022-03-29 DIAGNOSIS — K62.89 OTHER SPECIFIED DISEASES OF ANUS AND RECTUM: ICD-10-CM

## 2022-03-29 DIAGNOSIS — R33.9 RETENTION OF URINE, UNSPECIFIED: ICD-10-CM

## 2022-03-29 DIAGNOSIS — F41.9 ANXIETY DISORDER, UNSPECIFIED: ICD-10-CM

## 2022-03-29 DIAGNOSIS — J45.909 UNSPECIFIED ASTHMA, UNCOMPLICATED: ICD-10-CM

## 2022-03-29 DIAGNOSIS — Z88.1 ALLERGY STATUS TO OTHER ANTIBIOTIC AGENTS STATUS: ICD-10-CM

## 2022-03-29 DIAGNOSIS — Z91.013 ALLERGY TO SEAFOOD: ICD-10-CM

## 2022-03-29 DIAGNOSIS — Z88.8 ALLERGY STATUS TO OTHER DRUGS, MEDICAMENTS AND BIOLOGICAL SUBSTANCES STATUS: ICD-10-CM

## 2022-03-29 DIAGNOSIS — Z96.0 PRESENCE OF UROGENITAL IMPLANTS: ICD-10-CM

## 2022-03-29 PROCEDURE — 99203 OFFICE O/P NEW LOW 30 MIN: CPT

## 2022-03-29 NOTE — ASSESSMENT
[FreeTextEntry1] : 22 yo with long-standing pelvic floor dysfunction, chronic constipation and baseline urinary difficulty/hesitancy (long-standing- sometimes needs to get into warm water to urinate) who went into retention post fissurectomy.  I believe her bladder became overdistended post op and contributed to her 2 failed trials of void.  I believe it is too early to attempt catheter removal at this time, but I would suggest a trial of void on Thursday, 3/31.   Narcotics will clearly worsen her chronic constipation so I have advised she not use these.   Her baseline pelvic floor dysfunction is likely the root of her current urinary problem.  I would suggest a trial of void on Thursday and if unsuccessful, Urodynamics.

## 2022-03-29 NOTE — HISTORY OF PRESENT ILLNESS
[FreeTextEntry1] : 22 yo female with complex pelvic history seen today for a possible trial of void.  She is s/p anal fissurectomy by Dr. Marquez on 3/25.   She was unable to urinate post-procedure and was seen in a [place] ED on Friday night.  She states a large volume of urine was obtained, ("had to empty bag twice once catheter inserted).  She then had the catheter removed due to discomfort on Sunday at [facility] ER.  Again, she failed her trial of void and catheter reinserted.   Of note, was taking narcotics for pain.  Hx of requiring Miralax 3x per day baseline. \par \par PMH: IBS, chronic constipation, anxiety, PFD, Fibromyalgia, chronic fatigue\par PSH: umbilical repair age 7\par Meds: Lyrica, ketamine infusions, Latuda

## 2022-03-29 NOTE — HISTORY OF PRESENT ILLNESS
[FreeTextEntry1] : Marquita Marquez MD\par [address], [place] [address]\par \par Keren Jensen MD\par [address] [place] [address]\par \par CC: Urinary frequency\par \par HPI: \par Last week patient had a fissurectomy and Botox injection with Dr. Marquez.  She has been in the ER twice.  She could not urinate "at all, given a catheter, they removed, still couldn't urinate, so catheter placed again, then came to [place] ER and they removed it, could go, so they put another catheter".   She has a catheter in at this time.  She states she has a history of pelvic floor dysfunction, amplified muscular pain syndrome.  \par \par No prior history of urinary retention, but she does have a history or urinary frequency. No medications for the frequency; she did try PT for over 1 year without benefit.  She was given Valium suppositories by the PT specialist, and "may be a little benefit in terms of pelvic pain".  \par \par FAMHX: no urologic history \par MEDHX: iron deficiency anemia \par SURGHX: fissurectomy \par SOCIAL: , no children\par ROS: dry mouth, otherwise negative 10 system review \par

## 2022-03-29 NOTE — ASSESSMENT
[FreeTextEntry1] : Diagnosis\par urinary retention \par \par Plan:\par Discussed void trial vs. UDS\par Prefer UDS given multiple failed voiding trial \par \par Recent negative UCX\par \par Deng Dumont MD, FRCS \par  of Urology [facility]\par Director of Laparoscopic and Robotic Surgery \par [facility] Director of Urology, [facility] \par Professor of Urology\par \par (Office) \par (Cell)  [phone] \par [email or web address] \par \par \par

## 2022-03-30 ENCOUNTER — NON-APPOINTMENT (OUTPATIENT)
Age: 22
End: 2022-03-30

## 2022-03-31 ENCOUNTER — APPOINTMENT (OUTPATIENT)
Dept: UROLOGY | Facility: CLINIC | Age: 22
End: 2022-03-31
Payer: MEDICAID

## 2022-03-31 PROCEDURE — 99214 OFFICE O/P EST MOD 30 MIN: CPT

## 2022-04-01 ENCOUNTER — NON-APPOINTMENT (OUTPATIENT)
Age: 22
End: 2022-04-01

## 2022-04-01 NOTE — PHYSICAL EXAM
[General Appearance - Well Developed] : well developed [General Appearance - Well Nourished] : well nourished [Normal Appearance] : normal appearance [Well Groomed] : well groomed [General Appearance - In No Acute Distress] : no acute distress [Edema] : no peripheral edema [Respiration, Rhythm And Depth] : normal respiratory rhythm and effort [Exaggerated Use Of Accessory Muscles For Inspiration] : no accessory muscle use [Abdomen Soft] : soft [Abdomen Tenderness] : non-tender [] : no hepato-splenomegaly [Costovertebral Angle Tenderness] : no ~M costovertebral angle tenderness [Normal Station and Gait] : the gait and station were normal for the patient's age [Oriented To Time, Place, And Person] : oriented to person, place, and time

## 2022-04-01 NOTE — HISTORY OF PRESENT ILLNESS
[FreeTextEntry1] : 20 yo female with complex pelvic history seen today for a possible trial of void. She is s/p anal fissurectomy by Dr. Marquez on 3/25. She was unable to urinate post-procedure and was seen in a [place] ED on Friday night. She states a large volume of urine was obtained, ("had to empty bag twice once catheter inserted). She then had the catheter removed due to discomfort on Sunday at [facility] ER. Again, she failed her trial of void and catheter reinserted. Of note, was taking narcotics for pain. Hx of requiring Miralax 3x per day baseline. \par \par She was subsequently diagnosed with a UTI yesterday and given a course of Macrobid which she is only taken a couple of doses up until this point.\par \par She does note that she has worked with physical therapy in the past for these pelvic floor issues.\par \par PMH: IBS, chronic constipation, anxiety, PFD, Fibromyalgia, chronic fatigue\par PSH: umbilical repair age 7\par Meds: Lyrica, ketamine infusions, Latuda

## 2022-04-01 NOTE — ASSESSMENT
[FreeTextEntry1] : \par \par Impression/plan: 21-year-old woman with baseline cold for dysfunction and dysfunctional voiding status post an anal fissurectomy with postoperative urinary retention likely secondary to pain, pelvic floor dysfunction as well as narcotic use.  I explained to the patient that given the fact that she has now been recently diagnosed with a urinary tract infection and just started antibiotics, I would not intervene with a trial of void today.  My recommendation is to continue with the Pascal catheter for a couple of days and then return to the office for trial of void.  If she fails another trial of void we can consider urodynamic study however I do believe that this is really all related to pelvic floor dysfunction/dysfunctional voiding and not a primary bladder function issue.

## 2022-04-05 ENCOUNTER — APPOINTMENT (OUTPATIENT)
Dept: UROLOGY | Facility: CLINIC | Age: 22
End: 2022-04-05
Payer: MEDICAID

## 2022-04-05 LAB — SURGICAL PATHOLOGY STUDY: SIGNIFICANT CHANGE UP

## 2022-04-05 PROCEDURE — 51702 INSERT TEMP BLADDER CATH: CPT

## 2022-04-05 PROCEDURE — 99213 OFFICE O/P EST LOW 20 MIN: CPT | Mod: 25

## 2022-04-05 NOTE — HISTORY OF PRESENT ILLNESS
[FreeTextEntry1] : 20 yo female with complex pelvic history seen today for a possible trial of void. She is s/p anal fissurectomy by Dr. Marquez on 3/25. She was unable to urinate post-procedure and was seen in a [place] ED on Friday night. She states a large volume of urine was obtained, ("had to empty bag twice once catheter inserted). She then had the catheter removed due to discomfort on Sunday at [facility] ER. Again, she failed her trial of void and catheter reinserted. Of note, was taking narcotics for pain. Hx of requiring Miralax 3x per day baseline. \par \par She was subsequently diagnosed with a UTI yesterday and given a course of Macrobid which she is only taken a couple of doses up until this point.\par \par She does note that she has worked with physical therapy in the past for these pelvic floor issues.\par \par Fill and pull trial of void performed with the patient today.  Instilled a total of approximately 200 mL, the patient unable to void at all volitionally.  She does endorse she has persistent perianal and rectal pain that has not let up since her surgery approximately a week and a half ago.  I did call and speak with Dr. Marquez, she stated that the pain could last for the next couple of weeks and has her on a pain regimen.

## 2022-04-05 NOTE — ASSESSMENT
[FreeTextEntry1] : \par \par Impression/plan: 21-year-old woman with pelvis function and dysfunctional voiding week and a half status post fissurectomy with anal Botox injection with persistent exacerbated pill for dysfunction and urinary tension.  At this point she is not interested in CIC, I placed a 14 [place] latex free Pascal catheter to help with bladder care.  She is can follow-up with me in 1 week for repeat trial of void.  Hopefully by that time a significant portion of her pain will have improved and along with that her pelvic floor dysfunction.

## 2022-04-05 NOTE — PHYSICAL EXAM
[General Appearance - Well Developed] : well developed [General Appearance - Well Nourished] : well nourished [Normal Appearance] : normal appearance [Well Groomed] : well groomed [General Appearance - In No Acute Distress] : no acute distress [Abdomen Soft] : soft [Abdomen Tenderness] : non-tender [Costovertebral Angle Tenderness] : no ~M costovertebral angle tenderness [FreeTextEntry1] : 14 [place] latex free Pascal catheter placed without difficulty. [Edema] : no peripheral edema [] : no respiratory distress [Respiration, Rhythm And Depth] : normal respiratory rhythm and effort [Exaggerated Use Of Accessory Muscles For Inspiration] : no accessory muscle use [Oriented To Time, Place, And Person] : oriented to person, place, and time [Normal Station and Gait] : the gait and station were normal for the patient's age

## 2022-04-06 ENCOUNTER — NON-APPOINTMENT (OUTPATIENT)
Age: 22
End: 2022-04-06

## 2022-04-11 ENCOUNTER — NON-APPOINTMENT (OUTPATIENT)
Age: 22
End: 2022-04-11

## 2022-04-12 ENCOUNTER — APPOINTMENT (OUTPATIENT)
Dept: UROLOGY | Facility: CLINIC | Age: 22
End: 2022-04-12
Payer: MEDICAID

## 2022-04-12 PROCEDURE — 99213 OFFICE O/P EST LOW 20 MIN: CPT

## 2022-04-12 NOTE — ASSESSMENT
[FreeTextEntry1] : \par \par Impression/plan: 21-year-old woman with PFD and dysfunctional voiding week and a half status post fissurectomy with anal Botox injection with persistent urinary retention.  She was actually able to void today with a PVR of 190 mL and was comfortable.  I advised timed and double void.  She will follow-up in 1 month for reevaluation.

## 2022-04-12 NOTE — HISTORY OF PRESENT ILLNESS
[FreeTextEntry1] : 22 yo female with complex pelvic history seen today for a possible trial of void. She is s/p anal fissurectomy by Dr. Marquez on 3/25. She was unable to urinate post-procedure and was seen in a [place] ED on Friday night. She states a large volume of urine was obtained, ("had to empty bag twice once catheter inserted). She then had the catheter removed due to discomfort on Sunday at [facility] ER. Again, she failed her trial of void and catheter reinserted. Of note, was taking narcotics for pain. Hx of requiring Miralax 3x per day baseline. \par \par She was subsequently diagnosed with a UTI yesterday and given a course of Macrobid which she is only taken a couple of doses up until this point.\par \par She does note that she has worked with physical therapy in the past for these pelvic floor issues.\par \par Patient is here today for another trial of void.  She did not want a fill and pull just the catheter to be removed and for her to drink fluid.  After some time she stated she was unable to void and had a PVR of 376 mL.  We discussed the option of learning CIC versus indwelling catheter.  She wanted to try again and she was actually able to void with a PVR of 190 mL.  She was comfortable and happy.

## 2022-04-12 NOTE — PHYSICAL EXAM
[General Appearance - Well Developed] : well developed [General Appearance - Well Nourished] : well nourished [Normal Appearance] : normal appearance [Well Groomed] : well groomed [General Appearance - In No Acute Distress] : no acute distress [] : no respiratory distress [Respiration, Rhythm And Depth] : normal respiratory rhythm and effort [Exaggerated Use Of Accessory Muscles For Inspiration] : no accessory muscle use [Oriented To Time, Place, And Person] : oriented to person, place, and time [Normal Station and Gait] : the gait and station were normal for the patient's age

## 2022-04-13 ENCOUNTER — APPOINTMENT (OUTPATIENT)
Dept: UROLOGY | Facility: CLINIC | Age: 22
End: 2022-04-13
Payer: MEDICAID

## 2022-04-13 ENCOUNTER — APPOINTMENT (OUTPATIENT)
Dept: UROLOGY | Facility: CLINIC | Age: 22
End: 2022-04-13

## 2022-04-13 DIAGNOSIS — R33.9 RETENTION OF URINE, UNSPECIFIED: ICD-10-CM

## 2022-04-13 PROCEDURE — 99213 OFFICE O/P EST LOW 20 MIN: CPT

## 2022-04-13 NOTE — PHYSICAL EXAM
[General Appearance - Well Developed] : well developed [General Appearance - Well Nourished] : well nourished [Normal Appearance] : normal appearance [Well Groomed] : well groomed [General Appearance - In No Acute Distress] : no acute distress [Abdomen Soft] : soft [Abdomen Tenderness] : non-tender [Urethral Meatus] : normal urethra [] : no respiratory distress [Respiration, Rhythm And Depth] : normal respiratory rhythm and effort [Exaggerated Use Of Accessory Muscles For Inspiration] : no accessory muscle use [Oriented To Time, Place, And Person] : oriented to person, place, and time [Mood] : the mood was normal [Affect] : the affect was normal [Not Anxious] : not anxious

## 2022-04-13 NOTE — HISTORY OF PRESENT ILLNESS
[FreeTextEntry1] : Dear Dr. Lee\par \par Ms. Parham is a 21 year old female being seen today to discuss CIC.\par She was seen yesterday in office with Dr. Lee where she passed voiding trial and had a PVR of 190mL.  Per patient when she went home she was unable to void x 8 hours and presented to local ER for catheter insertion.\par \par She has a complex history.  Underwent a fissurectomy and Botox injection with Dr. Marquez and has had difficulty voiding since that procedure and required multiple catheterizations.\par \par She is amenable to learning CIC today.

## 2022-04-13 NOTE — ASSESSMENT
[FreeTextEntry1] : 21 year old female with PFD, dysfunctional voiding, now with urinary retention post fissurectomy with Botox injection.\par -16 Fr catheter removed today without difficulty\par -Patient taught how to perform CIC with 12 Fr female catheter.  She was able to successfully return demonstrate the procedure.\par -We discussed performing CIC 4 x day as long as she is also voluntarily voiding.  If she is unable to void or develops lower abdominal pressure she will increase CIC as needed.\par -Supplies and handout given to patient\par -She will also keep a voiding diary until she follow up with Dr. Lee\par \par \par RTC to Dr. Lee in 1 month \par

## 2022-04-18 DIAGNOSIS — A49.9 URINARY TRACT INFECTION, SITE NOT SPECIFIED: ICD-10-CM

## 2022-04-18 DIAGNOSIS — N39.0 URINARY TRACT INFECTION, SITE NOT SPECIFIED: ICD-10-CM

## 2022-04-19 DIAGNOSIS — R39.15 URGENCY OF URINATION: ICD-10-CM

## 2022-04-20 ENCOUNTER — APPOINTMENT (OUTPATIENT)
Dept: UROLOGY | Facility: CLINIC | Age: 22
End: 2022-04-20
Payer: MEDICAID

## 2022-04-20 VITALS — SYSTOLIC BLOOD PRESSURE: 130 MMHG | TEMPERATURE: 97.9 F | DIASTOLIC BLOOD PRESSURE: 83 MMHG | HEART RATE: 69 BPM

## 2022-04-20 PROCEDURE — 99215 OFFICE O/P EST HI 40 MIN: CPT

## 2022-04-20 NOTE — HISTORY OF PRESENT ILLNESS
[FreeTextEntry1] : Name WILLIAM DUARTE \par MRN [id number] \par  May 24 2000 \par ------------------------------------------------------------------------------------------------------------------------------------------- \par Date of First Visit: 2022\par Referring Provider/PCP: Keren Jensen (PCP)\par ------------------------------------------------------------------------------------------------------------------------------------------- \par CC: Pelvic floor dysfunction, possible postoperative urinary retention\par  \par History of Present Illness: WILLIAM DUARTE is a 21 year female who presents for complex history of difficulty with voiding.  She has a longstanding history of dysfunctional voiding, IBS, anxiety, AMPS (amplified musculoskeletal pain syndrome), and chronic constipation with anal fissure s/p fissurectomy with Dr. Marquez on 3/25/22.  Postoperative course was notable for worsening of her urinary symptoms.  Most notably she complains of postoperative urinary retention requiring catheterization.  She has continued to fail trial of voids upon multiple office visits and was instructed to perform CIC. Of note she has had a very longstanding history of dysfunctional voiding back to childhood which may have made her more susceptible to postoperative retention.  Has also had several positive urine cultures which were treated with mild intermittent resolution of symptoms that came back shortly after stopping antibiotics.  She has been evaluated and managed by several urologist within our practice, as well as by colorectal surgery, psychology, and physiatry.  Her physiatrist had recommended a pelvic floor physical therapist who, per the patient, determined that there was no pelvic floor dysfunction and her symptoms were bladder in etiology.  Patient contacted me overnight 2 days ago for worsening urgency symptoms and the feeling of retention.  She had just catheterized herself at that time and was noted to have very little urine in her bladder, raising the suspicion that it was due to increased urgency.  I prescribed her a very short course of low-dose oxybutynin with her understanding that this could worsen retention.  Today in the office she states that her bladder pain and urgency symptoms are actually noticeably better taking the oxybutynin. \par \par

## 2022-04-20 NOTE — ASSESSMENT
[FreeTextEntry1] : Assessment:\par \par WILLIAM DUARTE is a 21 year female with complex LUTS and pain issues, with symptoms most reflective of pelvic floor dysfunction.\par \par \par Plan:\par -Oxybutynin 5 mg-advised to take only as needed.  She understands that this can worsen retention symptoms.  However, we discussed at length that her perceived urinary retention symptoms may ultimately reflect a worsening of her underlying pelvic floor dysfunction/dysfunctional voiding.\par -New straight catheter is ordered as requested by the patient-12 French x 6 inches Coloplast none lubricated catheters.  Order confirmed with [facility]\par -I advised her to minimize the number of catheterizations she performs per day.  It was explained that her sensation of being full is likely, at least in part, due to urinary urgency and inappropriate sensation of bladder filling (as opposed to high volume retention)\par -Highly recommend [facility] PFPT specialist; patient would like to hold off on a referral right now as she fears that referral may preclude her from successful referral to an AMPS specialist in [place] -she will let us know if and when we can place the referral for PFPT\par -Continue to follow-up with Dr. Marquez as scheduled\par -----------------------------------------------------------------------------------------------------\par \par Thank you for allowing me to assist in the care of your patient. \par Should you have any questions, please do not hesitate to reach out to me.\par \par \par Thomas Caballero MD\par Director, Endourology and [facility]\par [facility] at [facility]\par  of Urology\par [facility] [employer] [facility]\par Phone: [phone]\par \par [address]\par [facility] - [address]\par [facility]\par [place] [id number]\par \par \par

## 2022-04-21 ENCOUNTER — NON-APPOINTMENT (OUTPATIENT)
Age: 22
End: 2022-04-21

## 2022-04-26 ENCOUNTER — APPOINTMENT (OUTPATIENT)
Dept: COLORECTAL SURGERY | Facility: CLINIC | Age: 22
End: 2022-04-26
Payer: MEDICAID

## 2022-04-26 VITALS
TEMPERATURE: 98.3 F | DIASTOLIC BLOOD PRESSURE: 81 MMHG | BODY MASS INDEX: 23.27 KG/M2 | HEART RATE: 80 BPM | SYSTOLIC BLOOD PRESSURE: 119 MMHG | HEIGHT: 63.5 IN | WEIGHT: 133 LBS

## 2022-04-26 PROCEDURE — 99024 POSTOP FOLLOW-UP VISIT: CPT

## 2022-04-26 NOTE — HISTORY OF PRESENT ILLNESS
[FreeTextEntry1] : Pt is a 20 yo F presents today for post- op follow up\par \par h/o IBS, chronic constipation, anxiety (h/o constipation on oral pill, now w/ Ketamine infusions and Latuda), pelvic floor dysfunction, fibromyalgia, chronic fatigue, generalized weakness (followed by Pain management and Rheum as needed, on Lyrica and meloxicam), followed by physiatrist for pelvic floor dysfunction, s/p PT for over 1 year w/o improvement, nausea (on odansetron, gas x)\par PSH umbilical hernia repair age 7\par h/o anemia s/p iron infusions last received 2/2022 \par \par H/o anal fissure last seen in office 2/22/22\par \par S/p fissurectomy and botox injection 3/25/22\par \par Surgical Pathology Report - Auth (Verified)\par \par Specimen(s) Submitted\par Anal fissure\par \par Final Diagnosis\par \par Anal fissure, excision:\par - Squamous mucosa with hyperkeratosis and parakeratosis\par \par Pt reports post-op course c/b urinary retention, 8 hours after surgery went to [facility] that night in which chung catheter placed. Then went to [facility] ED for catheter discomfort, failed TOV at that time, was placed on chung catheter. Pt in and out of ED for issues with catheter and continued urinary retention with multiple catheterizations, was seen by URO multiple times and taught to self catheterize which improved sxs. Pt during this time had 2 episodes of UTI for which she was treated with cipro 500mg BID for 1 week each time- first episode treated by PCP a few days after surgery, second by urology Dr. Caballero 4/19. Pt finished last course yesterday. Urinary retention has resolved. Denies fever, chills, myalgias, urinary sxs at this time.\par \par Pt reports she has continued to have tearing anal pain throughout post-op period. Pt took oxycodone the first few days after surgery, and wasn't feeling much relief, called into the office, NP Black prescribed tramadol, which pt took 4 times in the last 4 weeks for break through pain, provided some relief. Pt reports she took tylenol a few times during this course as well, with mild relief. Pt reports pain had started to improve, but in the last week has worsened with burning sensation and sharp pain with BMs and pain with sitting. Pt also reports perianal skin tag since surgery that feels inflamed. \par Pt currently taking only meloxicam and lyrica for her chronic pain, no other pain medications.\par \par BH: every other day, sometimes more, sometimes multiple BMs due to incomplete emptying- starts with harder stool and then becomes looser. \par Pt taking miralax TID and linzess 290 once daily. \par Pt works with dietitician who advised not to eat too many fibrous foods, but has incorporated some- carrots, squash, drinks fruit smoothies. \par \par

## 2022-04-26 NOTE — PHYSICAL EXAM
[FreeTextEntry1] : Medical assistant present for duration of physical examination\par \par General no acute distress, alert and oriented\par Psych responding appropriately to questions\par Nonlabored breathing\par Ambulating without assistance\par \par Anorectal Exam:\par Inspection no cellulitis, posterior midline fissurectomy site clean based\par MOHINDER nontender, no masses palpated, no blood on gloved finger, decreased tone at rest\par

## 2022-04-26 NOTE — ASSESSMENT
[FreeTextEntry1] : Continue to monitor postoperative recovery s/p fissurectomy and botox injection 3/25/22\par Importance of GI management of bowel habits discussed and reinforced. \par Continue supportive care for pelvic floor dysfunction\par F/u in 1 month or sooner as needed\par All questions were answered, patient expressed understanding, and is agreeable to this plan.\par

## 2022-04-28 ENCOUNTER — NON-APPOINTMENT (OUTPATIENT)
Age: 22
End: 2022-04-28

## 2022-04-29 ENCOUNTER — NON-APPOINTMENT (OUTPATIENT)
Age: 22
End: 2022-04-29

## 2022-05-10 ENCOUNTER — NON-APPOINTMENT (OUTPATIENT)
Age: 22
End: 2022-05-10

## 2022-05-10 ENCOUNTER — APPOINTMENT (OUTPATIENT)
Dept: UROLOGY | Facility: CLINIC | Age: 22
End: 2022-05-10

## 2022-05-15 ENCOUNTER — EMERGENCY (EMERGENCY)
Facility: HOSPITAL | Age: 22
LOS: 1 days | Discharge: HOME | End: 2022-05-15
Attending: EMERGENCY MEDICINE | Admitting: EMERGENCY MEDICINE
Payer: COMMERCIAL

## 2022-05-15 VITALS
HEART RATE: 107 BPM | DIASTOLIC BLOOD PRESSURE: 80 MMHG | WEIGHT: 138.01 LBS | HEIGHT: 63.5 IN | SYSTOLIC BLOOD PRESSURE: 132 MMHG | TEMPERATURE: 100 F | RESPIRATION RATE: 18 BRPM | OXYGEN SATURATION: 98 %

## 2022-05-15 VITALS
DIASTOLIC BLOOD PRESSURE: 72 MMHG | OXYGEN SATURATION: 99 % | TEMPERATURE: 98 F | HEART RATE: 91 BPM | RESPIRATION RATE: 20 BRPM | SYSTOLIC BLOOD PRESSURE: 116 MMHG

## 2022-05-15 DIAGNOSIS — K62.5 HEMORRHAGE OF ANUS AND RECTUM: ICD-10-CM

## 2022-05-15 DIAGNOSIS — Z98.890 OTHER SPECIFIED POSTPROCEDURAL STATES: Chronic | ICD-10-CM

## 2022-05-15 DIAGNOSIS — K59.00 CONSTIPATION, UNSPECIFIED: ICD-10-CM

## 2022-05-15 DIAGNOSIS — Z88.8 ALLERGY STATUS TO OTHER DRUGS, MEDICAMENTS AND BIOLOGICAL SUBSTANCES: ICD-10-CM

## 2022-05-15 DIAGNOSIS — R63.0 ANOREXIA: ICD-10-CM

## 2022-05-15 DIAGNOSIS — Z91.011 ALLERGY TO MILK PRODUCTS: ICD-10-CM

## 2022-05-15 DIAGNOSIS — Z88.1 ALLERGY STATUS TO OTHER ANTIBIOTIC AGENTS STATUS: ICD-10-CM

## 2022-05-15 DIAGNOSIS — Z87.19 PERSONAL HISTORY OF OTHER DISEASES OF THE DIGESTIVE SYSTEM: ICD-10-CM

## 2022-05-15 DIAGNOSIS — F41.9 ANXIETY DISORDER, UNSPECIFIED: ICD-10-CM

## 2022-05-15 DIAGNOSIS — Z20.822 CONTACT WITH AND (SUSPECTED) EXPOSURE TO COVID-19: ICD-10-CM

## 2022-05-15 DIAGNOSIS — R10.30 LOWER ABDOMINAL PAIN, UNSPECIFIED: ICD-10-CM

## 2022-05-15 DIAGNOSIS — Z88.0 ALLERGY STATUS TO PENICILLIN: ICD-10-CM

## 2022-05-15 DIAGNOSIS — Z91.013 ALLERGY TO SEAFOOD: ICD-10-CM

## 2022-05-15 DIAGNOSIS — K58.9 IRRITABLE BOWEL SYNDROME WITHOUT DIARRHEA: ICD-10-CM

## 2022-05-15 DIAGNOSIS — M79.7 FIBROMYALGIA: ICD-10-CM

## 2022-05-15 DIAGNOSIS — Z87.448 PERSONAL HISTORY OF OTHER DISEASES OF URINARY SYSTEM: ICD-10-CM

## 2022-05-15 DIAGNOSIS — J45.909 UNSPECIFIED ASTHMA, UNCOMPLICATED: ICD-10-CM

## 2022-05-15 LAB
ALBUMIN SERPL ELPH-MCNC: 4.6 G/DL — SIGNIFICANT CHANGE UP (ref 3.3–5)
ALP SERPL-CCNC: 37 U/L — LOW (ref 40–120)
ALT FLD-CCNC: 10 U/L — SIGNIFICANT CHANGE UP (ref 10–45)
ANION GAP SERPL CALC-SCNC: 13 MMOL/L — SIGNIFICANT CHANGE UP (ref 5–17)
APTT BLD: 36.6 SEC — HIGH (ref 27.5–35.5)
AST SERPL-CCNC: 14 U/L — SIGNIFICANT CHANGE UP (ref 10–40)
BASOPHILS # BLD AUTO: 0.02 K/UL — SIGNIFICANT CHANGE UP (ref 0–0.2)
BASOPHILS NFR BLD AUTO: 0.2 % — SIGNIFICANT CHANGE UP (ref 0–2)
BILIRUB SERPL-MCNC: 0.3 MG/DL — SIGNIFICANT CHANGE UP (ref 0.2–1.2)
BUN SERPL-MCNC: 11 MG/DL — SIGNIFICANT CHANGE UP (ref 7–23)
CALCIUM SERPL-MCNC: 9.3 MG/DL — SIGNIFICANT CHANGE UP (ref 8.4–10.5)
CHLORIDE SERPL-SCNC: 107 MMOL/L — SIGNIFICANT CHANGE UP (ref 96–108)
CO2 SERPL-SCNC: 23 MMOL/L — SIGNIFICANT CHANGE UP (ref 22–31)
CREAT SERPL-MCNC: 0.66 MG/DL — SIGNIFICANT CHANGE UP (ref 0.5–1.3)
EGFR: 127 ML/MIN/1.73M2 — SIGNIFICANT CHANGE UP
EOSINOPHIL # BLD AUTO: 0.01 K/UL — SIGNIFICANT CHANGE UP (ref 0–0.5)
EOSINOPHIL NFR BLD AUTO: 0.1 % — SIGNIFICANT CHANGE UP (ref 0–6)
GLUCOSE SERPL-MCNC: 90 MG/DL — SIGNIFICANT CHANGE UP (ref 70–99)
HCG SERPL-ACNC: <0 MIU/ML — SIGNIFICANT CHANGE UP
HCT VFR BLD CALC: 41.9 % — SIGNIFICANT CHANGE UP (ref 34.5–45)
HGB BLD-MCNC: 13.8 G/DL — SIGNIFICANT CHANGE UP (ref 11.5–15.5)
IMM GRANULOCYTES NFR BLD AUTO: 0.3 % — SIGNIFICANT CHANGE UP (ref 0–1.5)
INR BLD: 1.06 — SIGNIFICANT CHANGE UP (ref 0.88–1.16)
LYMPHOCYTES # BLD AUTO: 1.38 K/UL — SIGNIFICANT CHANGE UP (ref 1–3.3)
LYMPHOCYTES # BLD AUTO: 15.1 % — SIGNIFICANT CHANGE UP (ref 13–44)
MCHC RBC-ENTMCNC: 29.4 PG — SIGNIFICANT CHANGE UP (ref 27–34)
MCHC RBC-ENTMCNC: 32.9 GM/DL — SIGNIFICANT CHANGE UP (ref 32–36)
MCV RBC AUTO: 89.3 FL — SIGNIFICANT CHANGE UP (ref 80–100)
MONOCYTES # BLD AUTO: 0.68 K/UL — SIGNIFICANT CHANGE UP (ref 0–0.9)
MONOCYTES NFR BLD AUTO: 7.5 % — SIGNIFICANT CHANGE UP (ref 2–14)
NEUTROPHILS # BLD AUTO: 6.99 K/UL — SIGNIFICANT CHANGE UP (ref 1.8–7.4)
NEUTROPHILS NFR BLD AUTO: 76.8 % — SIGNIFICANT CHANGE UP (ref 43–77)
NRBC # BLD: 0 /100 WBCS — SIGNIFICANT CHANGE UP (ref 0–0)
PLATELET # BLD AUTO: 226 K/UL — SIGNIFICANT CHANGE UP (ref 150–400)
POTASSIUM SERPL-MCNC: 3.7 MMOL/L — SIGNIFICANT CHANGE UP (ref 3.5–5.3)
POTASSIUM SERPL-SCNC: 3.7 MMOL/L — SIGNIFICANT CHANGE UP (ref 3.5–5.3)
PROT SERPL-MCNC: 6.9 G/DL — SIGNIFICANT CHANGE UP (ref 6–8.3)
PROTHROM AB SERPL-ACNC: 12.6 SEC — SIGNIFICANT CHANGE UP (ref 10.5–13.4)
RBC # BLD: 4.69 M/UL — SIGNIFICANT CHANGE UP (ref 3.8–5.2)
RBC # FLD: 12.8 % — SIGNIFICANT CHANGE UP (ref 10.3–14.5)
SARS-COV-2 RNA SPEC QL NAA+PROBE: NEGATIVE — SIGNIFICANT CHANGE UP
SODIUM SERPL-SCNC: 143 MMOL/L — SIGNIFICANT CHANGE UP (ref 135–145)
WBC # BLD: 9.11 K/UL — SIGNIFICANT CHANGE UP (ref 3.8–10.5)
WBC # FLD AUTO: 9.11 K/UL — SIGNIFICANT CHANGE UP (ref 3.8–10.5)

## 2022-05-15 PROCEDURE — 74176 CT ABD & PELVIS W/O CONTRAST: CPT | Mod: MG

## 2022-05-15 PROCEDURE — 36415 COLL VENOUS BLD VENIPUNCTURE: CPT

## 2022-05-15 PROCEDURE — G1004: CPT

## 2022-05-15 PROCEDURE — 99284 EMERGENCY DEPT VISIT MOD MDM: CPT | Mod: 25

## 2022-05-15 PROCEDURE — 96374 THER/PROPH/DIAG INJ IV PUSH: CPT

## 2022-05-15 PROCEDURE — 87635 SARS-COV-2 COVID-19 AMP PRB: CPT

## 2022-05-15 PROCEDURE — 85610 PROTHROMBIN TIME: CPT

## 2022-05-15 PROCEDURE — 99285 EMERGENCY DEPT VISIT HI MDM: CPT

## 2022-05-15 PROCEDURE — 85730 THROMBOPLASTIN TIME PARTIAL: CPT

## 2022-05-15 PROCEDURE — 80053 COMPREHEN METABOLIC PANEL: CPT

## 2022-05-15 PROCEDURE — 85025 COMPLETE CBC W/AUTO DIFF WBC: CPT

## 2022-05-15 PROCEDURE — 74176 CT ABD & PELVIS W/O CONTRAST: CPT | Mod: 26,MG

## 2022-05-15 PROCEDURE — 84702 CHORIONIC GONADOTROPIN TEST: CPT

## 2022-05-15 RX ORDER — IOHEXOL 300 MG/ML
30 INJECTION, SOLUTION INTRAVENOUS ONCE
Refills: 0 | Status: COMPLETED | OUTPATIENT
Start: 2022-05-15 | End: 2022-05-15

## 2022-05-15 RX ORDER — KETOROLAC TROMETHAMINE 30 MG/ML
15 SYRINGE (ML) INJECTION ONCE
Refills: 0 | Status: DISCONTINUED | OUTPATIENT
Start: 2022-05-15 | End: 2022-05-15

## 2022-05-15 RX ADMIN — Medication 15 MILLIGRAM(S): at 21:13

## 2022-05-15 RX ADMIN — IOHEXOL 30 MILLILITER(S): 300 INJECTION, SOLUTION INTRAVENOUS at 20:51

## 2022-05-15 NOTE — ED PROVIDER NOTE - OBJECTIVE STATEMENT
21F anal fissure s/p fissurectomy/botox injection (3/25/22) c/b urinary retention s/p chung since removed, referred in by surgery for r/o obstruction. pt c/o 5d no bm while on linzess only. only small 1cm sized hard stool earlier today. +brbpr streaking w/ passage of hard stool. +lower abd cramping pain. +tenesmus. +nausea. +decreased appetite/po intake reportedly to "feeling full." no fever/chills, no uri/cough, no cp/sob, no n/v, no diarrhea, no dysuria, no rash, no trauma.    surgery: maria elena

## 2022-05-15 NOTE — ED PROVIDER NOTE - CLINICAL SUMMARY MEDICAL DECISION MAKING FREE TEXT BOX
avss. nontoxic. NAD. no systemic sx. no acute surgical abd. anus w/o trauma or active bleeding. no leukocytosis vs significant anemia vs electrolyte abnl. hcg neg. found to have moderate colonic stool burden w/o other acute pathology on ct a/p. surgery consulted. reccs for suppository vs enema. after d/w pt, pt requesting enema in ED prior to dc home. pt already w/ additional bowel regimen Rx per GI to start tmw. also already w/ scheduled outpatient surgery fu. pain controlled. tolerating po. feels safe going home. no indication for inpatient hospitalization at this time. will dc w/ outpatient surgery fu. strict return precautions. pt/family agrees w/ plan. questions answered.

## 2022-05-15 NOTE — ED ADULT TRIAGE NOTE - CHIEF COMPLAINT QUOTE
Pt co constipation x5 days. States, "I have pelvic floor dysfunction and I had an anal fissure removal 6 weeks ago and they told me I need to make sure I don't have a blockage."

## 2022-05-15 NOTE — CONSULT NOTE ADULT - SUBJECTIVE AND OBJECTIVE BOX
20 yo F PMH of amplified MSK pain syndrome, pelvic floor dysfuction, chronic nausea, chronic constipation, SP fissurectomy and Botox injection on March 25, 2022. She came to the ED for 5 days hx of constipation, not being able to pass a bowel movement at all despite straining. She also denied passing gas in the past few days. These symptoms coincided with stopping her Miralax and starting Linzess per her GI doc recommendation as she was having accidents with the Miralax. Her associated symptoms are nausea. She indicated that the straining from the past few days had worsened her pelvic and lower extremity pain (worsening her pre-existing MSK pain)  22 yo F PMH of amplified MSK pain syndrome, pelvic floor dysfuction, chronic nausea, chronic constipation, SP fissurectomy and Botox injection on March 25, 2022. She came to the ED for 5 days hx of constipation, not being able to pass a bowel movement at all despite straining. She also denied passing gas in the past few days. These symptoms coincided with stopping her Miralax and starting Linzess per her GI doc recommendation as she was having accidents with the Miralax. Her associated symptoms are nausea, bloating and right sided abdominal pain. She indicated that the straining from the past few days had worsened her pelvic and lower extremity pain (worsening her pre-existing MSK pain). She denied any per rectal bleeding aside from very minute amount when she was straining yesterday. She endorsed passing one very small hard ball of stool today.   In the ED, her temp 37.7, HR was 107 bpm initially, /80 on RA. Her WBC 9.11 F. CT showed moderate colonic stool burden.   PAST MEDICAL & SURGICAL HISTORY:  Severe anxiety      H/O headache      Asthma      Acid reflux      H/O fibromyalgia      Anemia      Constipation      History of nausea      IBS (irritable bowel syndrome)      Rosacea      Amplified musculoskeletal pain syndrome      Arthralgia of both knees      H/O hernia repair  umbilical      S/P endoscopy      S/P colonoscopy      S/P biopsy  face          MEDICATIONS  (STANDING):    MEDICATIONS  (PRN):      Allergies    amitriptyline (Other)  ceftriaxone (Rash)  clindamycin (Vomiting)  doxycycline (Vomiting; Nausea)  fish (Nausea)  fluoxetine (Other)  lactose (Other)  Lamictal (Rash)  Seroquel (Unknown)  sertraline (Other)  Vraylar (Vomiting)    Intolerances        SOCIAL HISTORY:    FAMILY HISTORY:      Vital Signs Last 24 Hrs  T(C): 36.9 (15 May 2022 23:16), Max: 37.7 (15 May 2022 20:02)  T(F): 98.5 (15 May 2022 23:16), Max: 99.9 (15 May 2022 20:02)  HR: 91 (15 May 2022 23:16) (91 - 107)  BP: 116/72 (15 May 2022 23:16) (116/72 - 132/80)  BP(mean): --  RR: 20 (15 May 2022 23:16) (18 - 20)  SpO2: 99% (15 May 2022 23:16) (98% - 99%)    I&O's Summary      Physical Exam:  General: NAD  HEENT: NC/AT, EOMI, normal hearing, no oral lesions, no LAD, neck supple  Pulmonary: normal resp effort  Cardiovascular: NSR, no murmurs  Abdominal: soft, diffusely tender and distended mostly in the lower abdomen   MOHINDER: no blood or stool externally. external Hemorrhoids X3, one posterior anal fissure, no stool or blood in the rectal vault   Extremities: WWP, normal strength, no clubbing/cyanosis/edema  Neuro: A/O x 3, CNs II-XII grossly intact, normal sensation, no focal deficits  Pulses: palpable distal pulses    Lines/drains/tubes:    LABS:                        13.8   9.11  )-----------( 226      ( 15 May 2022 20:32 )             41.9     05-15    143  |  107  |  11  ----------------------------<  90  3.7   |  23  |  0.66    Ca    9.3      15 May 2022 20:32    TPro  6.9  /  Alb  4.6  /  TBili  0.3  /  DBili  x   /  AST  14  /  ALT  10  /  AlkPhos  37<L>  05-15    PT/INR - ( 15 May 2022 20:32 )   PT: 12.6 sec;   INR: 1.06          PTT - ( 15 May 2022 20:32 )  PTT:36.6 sec    CAPILLARY BLOOD GLUCOSE        LIVER FUNCTIONS - ( 15 May 2022 20:32 )  Alb: 4.6 g/dL / Pro: 6.9 g/dL / ALK PHOS: 37 U/L / ALT: 10 U/L / AST: 14 U/L / GGT: x             Cultures:      RADIOLOGY & ADDITIONAL STUDIES:

## 2022-05-15 NOTE — ED PROVIDER NOTE - PROGRESS NOTE DETAILS
surgery consulted. will see pt. surgery reccs for suppository vs enema. pt anticipated to restart additional laxative tmw. okay for outpatient fu w/ dr arredondo.    pt requesting enema be placed in ED prior to dc home. pain controlled. no recurrent brbpr while in ED. surgery reccs for suppository vs enema. pt anticipated to restart additional laxative tmw. okay for outpatient fu w/ dr arredondo.    pt requesting enema be placed in ED prior to dc home. pain controlled. no recurrent brbpr while in ED. feels safe going home.

## 2022-05-15 NOTE — CONSULT NOTE ADULT - ASSESSMENT
20 yo F PMH of amplified MSK pain syndrome, pelvic floor dysfuction, chronic nausea, chronic constipation, SP fissurectomy and Botox injection on March 25, 2022. She presented to the ED for 5 days of constipation, bloating, abdominal pain, rectal pain and pelvic pain. MOHINDER: no stool in the rectal vault. CT showed moderate colonic stool burden. The recommendation is to restart her miralax, use laxative suppositories, if that fails she can use enema. She will need to maintain hydration. The patient will need to follow up with Dr. Marquez as an outpatient. Thank you for involving us in her care.

## 2022-05-15 NOTE — ED PROVIDER NOTE - NSFOLLOWUPINSTRUCTIONS_ED_ALL_ED_FT
PLEASE FOLLOW-UP WITH DR YANES AS SCHEDULED.    PLEASE RETURN TO THE EMERGENCY DEPARTMENT IF RECTAL BLEEDING, FEVER, CHEST PAIN, SHORTNESS OF BREATH, ABDOMINAL PAIN, VOMITING, OTHER CONCERNING SYMPTOMS.    PLEASE CONTACT STANISLAW MATA ([facility] EMERGENCY DEPARTMENT CLINICAL REFERRAL COORDINATOR) TO ASSIST IN SCHEDULING YOUR FOLLOW-UP APPOINTMENT.    Monday - Friday 11am-7pm  [phone]  [email or web address]

## 2022-05-15 NOTE — ED PROVIDER NOTE - PATIENT PORTAL LINK FT
You can access the FollowMyHealth Patient Portal offered by [facility] by registering at the following website: [email or web address]. By joining [employer]’s FollowMyHealth portal, you will also be able to view your health information using other applications (apps) compatible with our system.

## 2022-05-26 ENCOUNTER — APPOINTMENT (OUTPATIENT)
Dept: COLORECTAL SURGERY | Facility: CLINIC | Age: 22
End: 2022-05-26
Payer: MEDICAID

## 2022-05-26 VITALS
HEIGHT: 63.5 IN | HEART RATE: 71 BPM | SYSTOLIC BLOOD PRESSURE: 118 MMHG | WEIGHT: 133 LBS | BODY MASS INDEX: 23.27 KG/M2 | TEMPERATURE: 98.6 F | DIASTOLIC BLOOD PRESSURE: 76 MMHG

## 2022-05-26 PROCEDURE — 99024 POSTOP FOLLOW-UP VISIT: CPT

## 2022-05-26 NOTE — HISTORY OF PRESENT ILLNESS
[FreeTextEntry1] : Pt is a 23 yo F who presents today for second post-op follow up\par \par h/o IBS, chronic constipation, anxiety (h/o constipation on oral pill, now w/ Ketamine infusions and Latuda), pelvic floor dysfunction, fibromyalgia, chronic fatigue, generalized weakness (followed by Pain management and Rheum as needed, on Lyrica and meloxicam), followed by physiatrist for pelvic floor dysfunction, s/p PT for over 1 year w/o improvement, nausea (on odansetron, gas x)\par PSH umbilical hernia repair age 7\par h/o anemia s/p iron infusions last received 2/2022 \par \par H/o anal fissure last seen in office 2/22/22\par \par S/p fissurectomy and botox injection 3/25/22\par \par Surgical Pathology Report - Auth (Verified)\par \par Specimen(s) Submitted\par Anal fissure\par \par Final Diagnosis\par \par Anal fissure, excision:\par - Squamous mucosa with hyperkeratosis and parakeratosis\par \par Pt last seen 4/26/22 for initial post-op c/o continued tearing anal pain. pt had been prescribed tramadol with some relief. Previously had used oxycodone with no relief. pt reported only taking only meloxicam and lyrica for her chronic pain, no other pain medications.\par 4/26/22 anoscopy exam notable for no cellulitis, posterior midline fissurectomy site clean based\par MOHINDER nontender, no masses palpated, no blood on gloved finger, decreased tone at rest\par \par \par Of note, Post op course c/b urinary retention,s/p multiple catheterizations, was seen by URO multiple times and taught to self catheterize which improved sxs. Pt during this time had 2 episodes of UTI for which she was treated with cipro 500mg BID for 1 week each time- first episode treated by PCP a few days after surgery, second by urology Dr. Caballero 4/19. Pt finished last course yesterday. Urinary retention has resolved. Denies fever, chills, myalgias, urinary sxs at this time.\par \par \par Pt was advised to f/u with GI for management of bowel habits and to continue supportive care for pelvic floor dysfunction and to follow up in 1 month.\par \par In the interim, pt called into office multiple times c/o rectal pain. Pt was advised of supportive measures, continue miralax for softer stooling, increase sitz baths and use of calmol-4 suppositories \par Fluctuation between diarrhea and constipation, pt went to see GI Dr. Devaughn KAUFFMAN 5/11/22- pt was put on motegrity and linzess has brought a lot of relief per pt. Having more regular soft formed stooling. Pt has stopped miralax. \par \par Pt follows with Physiatrist Specialist Dr. Ruth, at [facility]. Seeing again in 6/2022. Was advised to follow up with CRS for acute issue of post-op pain. \par \par Pt follows with Pain Management Dr. Avitia, prescribed lyrica and meloxicam, whicih pt continues to take daily.\par \par Pt went to ER [facility] 5/15 for abdominal pain, rectal pain, bloating, and inability to defecate for 5 days, pt reports she had been waiting for motegrity at this time to be approved, was just taking linzess.\par CT A/P- No bowel obstruction. Moderate colonic stool burden. colonic diverticula noted. \par Pt received an enema which brought relief. \par \par Pt reports last Saturday 5/21 had abdominal pain and some BRBPR with BM which she attributes to the food she ate that day. Has since resolved. \par \par Pt reports pain level is unchanged. \par Pt reports BM have improved, no longer having incontinence and diarrhea.\par Currently not taking any pain medications other than the motegrity and linzess. \par \par BH: daily, varies from harder to softer stooling, but overall has improved. \par \par  \par

## 2022-05-26 NOTE — ASSESSMENT
[FreeTextEntry1] : Recovered well, patient reassured\par Continue bowel regimen and management of constipation per GI.\par Continue supportive care for pelvic floor dysfunction.\par f/u as needed.\par All questions were answered, patient expressed understanding, and is agreeable to this plan.\par

## 2022-05-26 NOTE — PHYSICAL EXAM
[FreeTextEntry1] : Medical assistant present for duration of physical examination\par \par General no acute distress, alert and oriented\par Psych pleasant, in good spirits\par Nonlabored breathing\par Ambulating without assistance\par Skin normal color and pigment, no visible lesions or rashes\par \par Anorectal Exam:\par Inspection posterior midline fissurectomy site is well healed with epithelialized wound\par MOHINDER nontender, no masses palpated, no blood on gloved finger\par \par Procedure: Anoscopy\par \par Pre procedure Diagnosis: s/p fissurectomy/botox\par Post procedure Diagnosis:  s/p fissurectomy/botox\par Anesthesia: none\par Estimated blood loss: none\par Specimen: none\par Complications: none\par \par Consent obtained. Anoscopy was performed by passing a lighted anoscope with lubricant jelly into the anal canal and the entire anal mucosal surface was inspected. Findings included well healed posterior midline fissurectomy site\par \par Patient tolerated examination and procedure well.\par \par \par

## 2022-05-27 ENCOUNTER — APPOINTMENT (OUTPATIENT)
Dept: UROLOGY | Facility: CLINIC | Age: 22
End: 2022-05-27
Payer: MEDICAID

## 2022-05-27 VITALS — HEART RATE: 62 BPM | DIASTOLIC BLOOD PRESSURE: 74 MMHG | SYSTOLIC BLOOD PRESSURE: 118 MMHG | TEMPERATURE: 97.3 F

## 2022-05-27 PROCEDURE — 99212 OFFICE O/P EST SF 10 MIN: CPT

## 2022-05-27 NOTE — HISTORY OF PRESENT ILLNESS
[FreeTextEntry1] : Name WILLIAM DUARTE \par MRN [id number] \par  May 24 2000 \par ------------------------------------------------------------------------------------------------------------------------------------------- \par Date of First Visit: 2022\par Referring Provider/PCP: Keren Jensen (PCP)\par ------------------------------------------------------------------------------------------------------------------------------------------- \par CC: Pelvic floor dysfunction, possible postoperative urinary retention\par  \par History of Present Illness: WILLIAM DUARTE is a 21 year female who presents for complex history of difficulty with voiding. She has a longstanding history of dysfunctional voiding, IBS, anxiety, AMPS (amplified musculoskeletal pain syndrome), and chronic constipation with anal fissure s/p fissurectomy with Dr. Marquez on 3/25/22. Postoperative course was notable for worsening of her urinary symptoms. Most notably she complains of postoperative urinary retention requiring catheterization. She has continued to fail trial of voids upon multiple office visits and was instructed to perform CIC. Of note she has had a very longstanding history of dysfunctional voiding back to childhood which may have made her more susceptible to postoperative retention. Has also had several positive urine cultures which were treated with mild intermittent resolution of symptoms that came back shortly after stopping antibiotics. She has been evaluated and managed by several urologist within our practice, as well as by colorectal surgery, psychology, and physiatry. Her physiatrist had recommended a pelvic floor physical therapist who, per the patient, determined that there was no pelvic floor dysfunction and her symptoms were bladder in etiology. Patient contacted me overnight 2 days ago for worsening urgency symptoms and the feeling of retention. She had just catheterized herself at that time and was noted to have very little urine in her bladder, raising the suspicion that it was due to increased urgency. I prescribed her a very short course of low-dose oxybutynin with her understanding that this could worsen retention. Today in the office she states that her bladder pain and urgency symptoms are actually noticeably better taking the oxybutynin. \par \par ------------------------------------------------------------------------------------------------------------------------------------------- \par Interval History (2022):  Patient presents for one month follow up.  She had been completing intermittent self catheterization several times a day.  A few days after her last appointment she was able to urinate on her own and empty her bladder well.  Today she states she is feeling better.  She has to self catheterize about one time per week when she becomes constipated.  She is working with her GI doctor to change medications to improve constipation.  She is still mildly bothered by urinary frequency but is no longer taking oxybutynin.  She has continued to follow up with her physiatrist for pelvic floor dysfunction and is taking Lyrica, meloxicam and rectal Valium.

## 2022-05-27 NOTE — ASSESSMENT
[FreeTextEntry1] : Assessment:\par WILLIAM DUARTE is a 22 year female with complex LUTS and pain issues, with symptoms most reflective of pelvic floor dysfunction. She no longer requires daily self catheterization. \par \par \par Plan:\par -Follow up PRN

## 2022-06-01 ENCOUNTER — APPOINTMENT (OUTPATIENT)
Dept: UROLOGY | Facility: CLINIC | Age: 22
End: 2022-06-01

## 2022-06-03 ENCOUNTER — APPOINTMENT (OUTPATIENT)
Dept: PAIN MANAGEMENT | Facility: CLINIC | Age: 22
End: 2022-06-03
Payer: MEDICAID

## 2022-06-03 DIAGNOSIS — G89.18 OTHER ACUTE POSTPROCEDURAL PAIN: ICD-10-CM

## 2022-06-03 DIAGNOSIS — G89.4 CHRONIC PAIN SYNDROME: ICD-10-CM

## 2022-06-03 PROCEDURE — 99203 OFFICE O/P NEW LOW 30 MIN: CPT

## 2022-06-03 NOTE — HISTORY OF PRESENT ILLNESS
[FreeTextEntry1] : 22 yof w/ complex medical history presents w/ multiple pain management complaints. She sees Dr. Tiana Avitia at [place] for pain management and takes Lyrica 200 mg BID and meloxicam. Reports her pain is not well-controlled when she has severe exacerbations. Quality of life is impaired. There has been a severe exacerbation of the patient's chronic pain.

## 2022-06-03 NOTE — ASSESSMENT
[FreeTextEntry1] : 22 yof w/ complex medical history presents w/ multiple pain complaints.\par \par I agree with the management of her current pain management physician, Dr. Tiana Avitia. Agree with Lyrica, can consider titrating up.\par \par Consider adding muscle relaxant if additional medication is necessary.\par \par RTC prn

## 2022-06-03 NOTE — PHYSICAL EXAM
[de-identified] : Constitutional: Well-developed, in no acute distress\par \par Musculoskeletal:\par Lumbar Spine:   Gait: Antalgic\par 		Inspection: Normal curvature, no abnormal kyphosis or scoliosis\par 		Facet loading: pain bilaterally\par 		Palpation:\par 			Lumbar and paraspinal muscles: pain bilaterally\par 			Sacroiliac joint: no pain\par 			Greater trochanter: no pain\par 		Muscle Strength:\par 		Iliopsoas: 5/5 bilaterally\par 		Quadriceps: 5/5 bilaterally\par 		Hamstrings: 5/5 bilaterally\par 		Tibialis anterior: 5/5 bilaterally\par 		Extensor hallucis longus: 5/5 bilaterally\par \par 		Sensation: normal and equal in bilateral lower extremities\par \par Extremity: no edema noted\par Neurological: Memory normal, AAO x 3, Cranial nerves II - XII grossly normal\par Psychiatric: Appropriate mood and affect, oriented to time, place, person, and situation\par \par \par

## 2022-06-09 ENCOUNTER — EMERGENCY (EMERGENCY)
Facility: HOSPITAL | Age: 22
LOS: 1 days | Discharge: HOME | End: 2022-06-09
Attending: STUDENT IN AN ORGANIZED HEALTH CARE EDUCATION/TRAINING PROGRAM | Admitting: STUDENT IN AN ORGANIZED HEALTH CARE EDUCATION/TRAINING PROGRAM
Payer: COMMERCIAL

## 2022-06-09 VITALS
DIASTOLIC BLOOD PRESSURE: 79 MMHG | RESPIRATION RATE: 18 BRPM | HEIGHT: 63.5 IN | WEIGHT: 132.94 LBS | HEART RATE: 88 BPM | SYSTOLIC BLOOD PRESSURE: 122 MMHG | TEMPERATURE: 98 F | OXYGEN SATURATION: 99 %

## 2022-06-09 DIAGNOSIS — R51.9 HEADACHE, UNSPECIFIED: ICD-10-CM

## 2022-06-09 DIAGNOSIS — Z98.890 OTHER SPECIFIED POSTPROCEDURAL STATES: Chronic | ICD-10-CM

## 2022-06-09 DIAGNOSIS — J45.909 UNSPECIFIED ASTHMA, UNCOMPLICATED: ICD-10-CM

## 2022-06-09 DIAGNOSIS — Z87.19 PERSONAL HISTORY OF OTHER DISEASES OF THE DIGESTIVE SYSTEM: ICD-10-CM

## 2022-06-09 DIAGNOSIS — G89.4 CHRONIC PAIN SYNDROME: ICD-10-CM

## 2022-06-09 DIAGNOSIS — H57.10 OCULAR PAIN, UNSPECIFIED EYE: ICD-10-CM

## 2022-06-09 DIAGNOSIS — L71.9 ROSACEA, UNSPECIFIED: ICD-10-CM

## 2022-06-09 DIAGNOSIS — K21.9 GASTRO-ESOPHAGEAL REFLUX DISEASE WITHOUT ESOPHAGITIS: ICD-10-CM

## 2022-06-09 DIAGNOSIS — M79.7 FIBROMYALGIA: ICD-10-CM

## 2022-06-09 DIAGNOSIS — Z88.8 ALLERGY STATUS TO OTHER DRUGS, MEDICAMENTS AND BIOLOGICAL SUBSTANCES: ICD-10-CM

## 2022-06-09 DIAGNOSIS — K58.9 IRRITABLE BOWEL SYNDROME WITHOUT DIARRHEA: ICD-10-CM

## 2022-06-09 DIAGNOSIS — F41.9 ANXIETY DISORDER, UNSPECIFIED: ICD-10-CM

## 2022-06-09 DIAGNOSIS — Z88.1 ALLERGY STATUS TO OTHER ANTIBIOTIC AGENTS STATUS: ICD-10-CM

## 2022-06-09 LAB
ALBUMIN SERPL ELPH-MCNC: 4.7 G/DL — SIGNIFICANT CHANGE UP (ref 3.3–5)
ALP SERPL-CCNC: 39 U/L — LOW (ref 40–120)
ALT FLD-CCNC: 10 U/L — SIGNIFICANT CHANGE UP (ref 10–45)
ANION GAP SERPL CALC-SCNC: 12 MMOL/L — SIGNIFICANT CHANGE UP (ref 5–17)
AST SERPL-CCNC: 15 U/L — SIGNIFICANT CHANGE UP (ref 10–40)
BASOPHILS # BLD AUTO: 0.02 K/UL — SIGNIFICANT CHANGE UP (ref 0–0.2)
BASOPHILS NFR BLD AUTO: 0.3 % — SIGNIFICANT CHANGE UP (ref 0–2)
BILIRUB SERPL-MCNC: 0.3 MG/DL — SIGNIFICANT CHANGE UP (ref 0.2–1.2)
BUN SERPL-MCNC: 12 MG/DL — SIGNIFICANT CHANGE UP (ref 7–23)
CALCIUM SERPL-MCNC: 9.6 MG/DL — SIGNIFICANT CHANGE UP (ref 8.4–10.5)
CHLORIDE SERPL-SCNC: 103 MMOL/L — SIGNIFICANT CHANGE UP (ref 96–108)
CO2 SERPL-SCNC: 25 MMOL/L — SIGNIFICANT CHANGE UP (ref 22–31)
CREAT SERPL-MCNC: 0.75 MG/DL — SIGNIFICANT CHANGE UP (ref 0.5–1.3)
EGFR: 115 ML/MIN/1.73M2 — SIGNIFICANT CHANGE UP
EOSINOPHIL # BLD AUTO: 0.03 K/UL — SIGNIFICANT CHANGE UP (ref 0–0.5)
EOSINOPHIL NFR BLD AUTO: 0.5 % — SIGNIFICANT CHANGE UP (ref 0–6)
GLUCOSE SERPL-MCNC: 99 MG/DL — SIGNIFICANT CHANGE UP (ref 70–99)
HCG SERPL-ACNC: <0 MIU/ML — SIGNIFICANT CHANGE UP
HCT VFR BLD CALC: 42.1 % — SIGNIFICANT CHANGE UP (ref 34.5–45)
HGB BLD-MCNC: 13.9 G/DL — SIGNIFICANT CHANGE UP (ref 11.5–15.5)
IMM GRANULOCYTES NFR BLD AUTO: 0.2 % — SIGNIFICANT CHANGE UP (ref 0–1.5)
LYMPHOCYTES # BLD AUTO: 1.32 K/UL — SIGNIFICANT CHANGE UP (ref 1–3.3)
LYMPHOCYTES # BLD AUTO: 21.1 % — SIGNIFICANT CHANGE UP (ref 13–44)
MCHC RBC-ENTMCNC: 29.6 PG — SIGNIFICANT CHANGE UP (ref 27–34)
MCHC RBC-ENTMCNC: 33 GM/DL — SIGNIFICANT CHANGE UP (ref 32–36)
MCV RBC AUTO: 89.8 FL — SIGNIFICANT CHANGE UP (ref 80–100)
MONOCYTES # BLD AUTO: 0.55 K/UL — SIGNIFICANT CHANGE UP (ref 0–0.9)
MONOCYTES NFR BLD AUTO: 8.8 % — SIGNIFICANT CHANGE UP (ref 2–14)
NEUTROPHILS # BLD AUTO: 4.32 K/UL — SIGNIFICANT CHANGE UP (ref 1.8–7.4)
NEUTROPHILS NFR BLD AUTO: 69.1 % — SIGNIFICANT CHANGE UP (ref 43–77)
NRBC # BLD: 0 /100 WBCS — SIGNIFICANT CHANGE UP (ref 0–0)
PLATELET # BLD AUTO: 268 K/UL — SIGNIFICANT CHANGE UP (ref 150–400)
POTASSIUM SERPL-MCNC: 3.8 MMOL/L — SIGNIFICANT CHANGE UP (ref 3.5–5.3)
POTASSIUM SERPL-SCNC: 3.8 MMOL/L — SIGNIFICANT CHANGE UP (ref 3.5–5.3)
PROT SERPL-MCNC: 7 G/DL — SIGNIFICANT CHANGE UP (ref 6–8.3)
RBC # BLD: 4.69 M/UL — SIGNIFICANT CHANGE UP (ref 3.8–5.2)
RBC # FLD: 12.4 % — SIGNIFICANT CHANGE UP (ref 10.3–14.5)
SODIUM SERPL-SCNC: 140 MMOL/L — SIGNIFICANT CHANGE UP (ref 135–145)
WBC # BLD: 6.25 K/UL — SIGNIFICANT CHANGE UP (ref 3.8–10.5)
WBC # FLD AUTO: 6.25 K/UL — SIGNIFICANT CHANGE UP (ref 3.8–10.5)

## 2022-06-09 PROCEDURE — 70496 CT ANGIOGRAPHY HEAD: CPT | Mod: 26,MA

## 2022-06-09 PROCEDURE — 99285 EMERGENCY DEPT VISIT HI MDM: CPT

## 2022-06-09 PROCEDURE — 70498 CT ANGIOGRAPHY NECK: CPT | Mod: 26,MA

## 2022-06-09 RX ORDER — ONDANSETRON 8 MG/1
4 TABLET, FILM COATED ORAL ONCE
Refills: 0 | Status: COMPLETED | OUTPATIENT
Start: 2022-06-09 | End: 2022-06-09

## 2022-06-09 RX ORDER — SODIUM CHLORIDE 9 MG/ML
1000 INJECTION INTRAMUSCULAR; INTRAVENOUS; SUBCUTANEOUS ONCE
Refills: 0 | Status: COMPLETED | OUTPATIENT
Start: 2022-06-09 | End: 2022-06-09

## 2022-06-09 RX ORDER — METOCLOPRAMIDE HCL 10 MG
10 TABLET ORAL ONCE
Refills: 0 | Status: COMPLETED | OUTPATIENT
Start: 2022-06-09 | End: 2022-06-09

## 2022-06-09 RX ADMIN — ONDANSETRON 4 MILLIGRAM(S): 8 TABLET, FILM COATED ORAL at 22:34

## 2022-06-09 RX ADMIN — SODIUM CHLORIDE 1000 MILLILITER(S): 9 INJECTION INTRAMUSCULAR; INTRAVENOUS; SUBCUTANEOUS at 22:35

## 2022-06-09 RX ADMIN — Medication 104 MILLIGRAM(S): at 22:34

## 2022-06-09 NOTE — ED PROVIDER NOTE - CLINICAL SUMMARY MEDICAL DECISION MAKING FREE TEXT BOX
anxious appearing young female pt presenting for headache  reassuring neuro exam without any red flag symptoms.  labs, iv headache meds, cth  anticipate pt for eventual discharge. may be signed out to night team pending ct scan.

## 2022-06-09 NOTE — ED PROVIDER NOTE - PATIENT PORTAL LINK FT
You can access the FollowMyHealth Patient Portal offered by [facility] by registering at the following website: [email or web address]. By joining [facility]’s FollowMyHealth portal, you will also be able to view your health information using other applications (apps) compatible with our system.

## 2022-06-09 NOTE — ED PROVIDER NOTE - PHYSICAL EXAMINATION
Constitutional: Well appearing, awake, alert, oriented to person, place, time/situation and in no apparent distress.  ENMT: Airway patent.  Eyes: Clear bilaterally, pupils equal, round and reactive to light.  Cardiac: Normal rate, regular rhythm.  Heart sounds S1, S2.  Respiratory: Breath sounds clear and equal bilaterally.  Gastrointestinal: Abdomen soft, non-tender, no guarding.  Neuro: CN II-XII intact, no motor or sensory deficits, cerebellar FTN/HTS intact, normal speech and coordination. No rigidity, supple extremities throughout, Gait: fluid.   Skin: No evidence of rash.

## 2022-06-09 NOTE — ED PROVIDER NOTE - OBJECTIVE STATEMENT
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 States was at PCPs office today and was told she should proceed to the emergency department for an MRI. Pt states she has tried both 1000mg tylenol and meloxicam in the last 24hrs with no relief. 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 States was at PCPs office today and was told she should proceed to the emergency department for an MRI. Pt states she has tried both 1000mg tylenol and meloxicam in the last 24hrs with no relief. 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 States onset of headache was progressive, not sudden; and without immediate point of maximal intensity. States was at PCPs office today and was told she should proceed to the emergency department for an MRI of her orbits. Pt states she has tried both 1000mg tylenol and meloxicam in the last 24hrs with no relief. Has an appt with a neuro ophthalmologist in 2 mos.

## 2022-06-09 NOTE — ED ADULT NURSE NOTE - OBJECTIVE STATEMENT
pt has hx of migraine headaches, is scheduled for f/u with neuro ophthalmologist but hasn't been able to get an appointment for 2 months, now c/o frontal headaches that started yesterday. Able to speak in full sentences, states that she needs an MRI of her head/eyes. ambulatory with a steady gait, no facial droop noted. pupils equal in size.

## 2022-06-09 NOTE — ED PROVIDER NOTE - NSFOLLOWUPINSTRUCTIONS_ED_ALL_ED_FT
General Headache Without Cause      A headache is pain or discomfort felt around the head or neck area. The specific cause of a headache may not be found. There are many causes and types of headaches. A few common ones are:  •Tension headaches.      •Migraine headaches.      •Cluster headaches.      •Chronic daily headaches.        Follow these instructions at home:    Watch your condition for any changes. Let your health care provider know about them. Take these steps to help with your condition:      Managing pain                   •Take over-the-counter and prescription medicines only as told by your health care provider.      •Lie down in a dark, quiet room when you have a headache.    •If directed, put ice on your head and neck area:  •Put ice in a plastic bag.      •Place a towel between your skin and the bag.      •Leave the ice on for 20 minutes, 2–3 times per day.      •If directed, apply heat to the affected area. Use the heat source that your health care provider recommends, such as a moist heat pack or a heating pad.  •Place a towel between your skin and the heat source.      •Leave the heat on for 20–30 minutes.      •Remove the heat if your skin turns bright red. This is especially important if you are unable to feel pain, heat, or cold. You may have a greater risk of getting burned.        •Keep lights dim if bright lights bother you or make your headaches worse.      Eating and drinking     •Eat meals on a regular schedule.    •If you drink alcohol:•Limit how much you use to:   •0–1 drink a day for women.       • 0–2 drinks a day for men.         •Be aware of how much alcohol is in your drink. In the [place] one drink equals one 12 oz bottle of beer (355 mL), one 5 oz glass of wine (148 mL), or one 1½ oz glass of hard liquor (44 mL).        •Stop drinking caffeine, or decrease the amount of caffeine you drink.        General instructions    •Keep a headache journal to help find out what may trigger your headaches. For example, write down:  •What you eat and drink.      •How much sleep you get.      •Any change to your diet or medicines.        •Try massage or other relaxation techniques.      •Limit stress.      •Sit up straight, and do not tense your muscles.      • Do not use any products that contain nicotine or tobacco, such as cigarettes, e-cigarettes, and chewing tobacco. If you need help quitting, ask your health care provider.      •Exercise regularly as told by your health care provider.      •Sleep on a regular schedule. Get 7–9 hours of sleep each night, or the amount recommended by your health care provider.      •Keep all follow-up visits as told by your health care provider. This is important.        Contact a health care provider if:    •Your symptoms are not helped by medicine.      •You have a headache that is different from the usual headache.      •You have nausea or you vomit.      •You have a fever.        Get help right away if:    •Your headache becomes severe quickly.      •Your headache gets worse after moderate to intense physical activity.      •You have repeated vomiting.      •You have a stiff neck.      •You have a loss of vision.      •You have problems with speech.      •You have pain in the eye or ear.      •You have muscular weakness or loss of muscle control.      •You lose your balance or have trouble walking.      •You feel faint or pass out.      •You have confusion.      •You have a seizure.        Summary    •A headache is pain or discomfort felt around the head or neck area.      •There are many causes and types of headaches. In some cases, the cause may not be found.      •Keep a headache journal to help find out what may trigger your headaches. Watch your condition for any changes. Let your health care provider know about them.      •Contact a health care provider if you have a headache that is different from the usual headache, or if your symptoms are not helped by medicine.      •Get help right away if your headache becomes severe, you vomit, you have a loss of vision, you lose your balance, or you have a seizure.      This information is not intended to replace advice given to you by your health care provider. Make sure you discuss any questions you have with your health care provider.    .

## 2022-06-10 VITALS
RESPIRATION RATE: 18 BRPM | TEMPERATURE: 98 F | OXYGEN SATURATION: 98 % | SYSTOLIC BLOOD PRESSURE: 115 MMHG | HEART RATE: 75 BPM | DIASTOLIC BLOOD PRESSURE: 78 MMHG

## 2022-06-10 PROCEDURE — 80053 COMPREHEN METABOLIC PANEL: CPT

## 2022-06-10 PROCEDURE — 99284 EMERGENCY DEPT VISIT MOD MDM: CPT | Mod: 25

## 2022-06-10 PROCEDURE — 84702 CHORIONIC GONADOTROPIN TEST: CPT

## 2022-06-10 PROCEDURE — 85025 COMPLETE CBC W/AUTO DIFF WBC: CPT

## 2022-06-10 PROCEDURE — 96374 THER/PROPH/DIAG INJ IV PUSH: CPT | Mod: XU

## 2022-06-10 PROCEDURE — 70450 CT HEAD/BRAIN W/O DYE: CPT | Mod: MA

## 2022-06-10 PROCEDURE — 70496 CT ANGIOGRAPHY HEAD: CPT | Mod: MA

## 2022-06-10 PROCEDURE — 36415 COLL VENOUS BLD VENIPUNCTURE: CPT

## 2022-06-10 PROCEDURE — 70498 CT ANGIOGRAPHY NECK: CPT | Mod: MA

## 2022-06-10 PROCEDURE — 96375 TX/PRO/DX INJ NEW DRUG ADDON: CPT | Mod: XU

## 2022-06-10 PROCEDURE — 70450 CT HEAD/BRAIN W/O DYE: CPT | Mod: 26,MA

## 2022-06-10 PROCEDURE — 96372 THER/PROPH/DIAG INJ SC/IM: CPT | Mod: XU

## 2022-06-10 RX ORDER — HALOPERIDOL DECANOATE 100 MG/ML
3 INJECTION INTRAMUSCULAR ONCE
Refills: 0 | Status: COMPLETED | OUTPATIENT
Start: 2022-06-10 | End: 2022-06-10

## 2022-06-10 RX ADMIN — HALOPERIDOL DECANOATE 3 MILLIGRAM(S): 100 INJECTION INTRAMUSCULAR at 00:24

## 2022-06-21 ENCOUNTER — APPOINTMENT (OUTPATIENT)
Dept: COLORECTAL SURGERY | Facility: CLINIC | Age: 22
End: 2022-06-21

## 2022-06-21 VITALS
BODY MASS INDEX: 23.27 KG/M2 | WEIGHT: 133 LBS | SYSTOLIC BLOOD PRESSURE: 133 MMHG | RESPIRATION RATE: 16 BRPM | OXYGEN SATURATION: 99 % | DIASTOLIC BLOOD PRESSURE: 81 MMHG | HEART RATE: 76 BPM | TEMPERATURE: 98.6 F | HEIGHT: 63.5 IN

## 2022-06-21 PROCEDURE — 46600 DIAGNOSTIC ANOSCOPY SPX: CPT | Mod: 58

## 2022-06-21 RX ORDER — PREGABALIN 200 MG/1
200 CAPSULE ORAL
Refills: 0 | Status: ACTIVE | COMMUNITY

## 2022-06-21 NOTE — ASSESSMENT
[FreeTextEntry1] : No anal fissure seen on examination.\par Patient feels persistent anal spasms and returns today to discuss repeating botox injections.\par We discussed EUA, botox injection can be considered as part of her management of anal spasm. We discussed the continued need for multidisciplinary care, including her pain management specialist, her physiatrist and GI, to continue to manage her other symptoms beyond anal spasm.\par The nature of the procedure as well as risks and benefits were reviewed with the patient. Risk/benefits/alternatives discussed at length, including but not limited to pain, bleeding, infection, swelling, recurrence of symptoms, need for future surgery, and risk of alteration of bowel habits, such as seepage, fecal urgency and/or fecal (gas, liquid or solid) incontinence discussed, which may be temporary or permanent. Postprocedural expectations regarding pain was discussed.  The preoperative, intraoperative, and postoperative management were discussed with the patient in detail. All questions were answered, patient expressed understanding, and would like to proceed with the proposed surgery. Informed consent was obtained. Ambulatory surgery instructions were given to patient\par

## 2022-06-21 NOTE — HISTORY OF PRESENT ILLNESS
[FreeTextEntry1] : 21 yo F presents for f/u anal fissure \par \par h/o IBS, chronic constipation, anxiety (h/o constipation on oral pill, now w/ Ketamine infusions and Latuda), pelvic floor dysfunction, fibromyalgia, chronic fatigue, generalized weakness (followed by Pain management and Rheum as needed, on Lyrica and meloxicam), followed by physiatrist for pelvic floor dysfunction, s/p PT for over 1 year w/o improvement, nausea (on odansetron, gas x)\par PSH umbilical hernia repair age 7\par h/o anemia s/p iron infusions last received 2/2022 \par \par s/p fissurectomy and botox injection 3/25/22\par Post op period c/b urinary retention, s/p multiple catheterizations, was seen by UROL multiple times and taught to self catheterize which improved symptoms. During this time, was treated for two UTIs, first episode treated by PCP w/ Cipro a few days after surgery and second episode treated by UROL Dr. Caballero, completed antibiotics 3/24.\par \par Seen 4/26 for post op follow up and pt c/o tearing anal pain. Pt had been prescribed Tramadol w/ some relief. Previously used oxycodone w/o relief. Exam noted clean based fissurectomy site, decreased tone at rest.\par Pt was advised to f/u with GI for management of bowel habits and to continue supportive care for pelvic floor dysfunction and to follow up in 1 month.\par \par In the interim, pt called into office multiple times c/o rectal pain. Pt was advised of supportive measures, continue miralax for softer stooling, increase sitz baths and use of calmol-4 suppositories \par \par Due to fluctuation between diarrhea and constipation, pt went to see GI Dr. Devaughn AKUFFMAN 5/11/22- pt was put on Motegrity and Linzess which brought relief w/ more regular soft formed stooling and patient stopped Miralax. \par \par Pt follows with Physiatrist Specialist Dr. Ruth, at [facility]. Seeing again in 6/2022. Was advised to follow up with CRS for acute issue of post-op pain. \par \par Pt follows with Pain Management Dr. Avitia, prescribed lyrica and meloxicam, jose pt continues to take daily.\par \par Pt went to ER [facility] 5/15 for abdominal pain, rectal pain, bloating, and inability to defecate for 5 days, pt reports she had been waiting for Motegrity at this time to be approved, was just taking Linzess.\par CT A/P- No bowel obstruction. Moderate colonic stool burden. colonic diverticula noted. \par Pt received an enema which brought relief. \par \par Last seen 5/26/22 for second post op follow up and reported unchanged pain level, not taking any pain medication. Reported BMs improved and no longer experienced diarrhea or incontinence. She continues on Linzess and Motegrity\par \par MOHINDER non tender on exam. Anoscopy revealed well healed posterior midline fissurectomy site w/ epithelialized wound. \par Pt advised to continue bowel regimen and management of constipation per GI, continue supportive care for pelvic floor dysfunction, f/u as needed\par \par Pt called c/o continued anal pain and inquiring the soonest timeframe to get Botox injections again\par Pain would be triggered by BMs, prolonged walking or sitting. Pain would eventually improve with rest for hours or if used 10 mg Diazepam rectal or vaginal suppository nightly. \par \par Saw physiatrist 6/1 who recommended consideration of Botox injections into anus as provider could only give Botox vaginally per patient\par \par As of this past Friday, pt reports pain worsened and includes spasming pain often triggered w/ physical activity or bowel movements. Pt continues Linzess 290 mcg in AM and Motegrity 2 mg in PM and will have daily BM as long as follows her diet. She drinks several lactose free Orgaine shakes, pureed soup w/ peas, smoothies, drinks some water.\par \par Admits she doesn't stick to diet on the weekends or holidays which worsens constipation and occasionally "cheats" during the week.\par \par Pt reports she felt the Botox wore off approx week 11.5 weeks and has been experiencing daily anal spasming since that is worsened and not improved w/ rectal diazepam and Tylenol\par \par In the interim, patient seen by UROL Dr. Caballero on 5/27 w/ report of improved urination and only requiring self catheterization once per week in setting of constipation. Reported only mildly bothered by urinary symptoms and no longer taking oxybutinin. She continues working w/ physiatrist for pelvic floor dysfunction and is taking Lyrica, meloxicam and rectal Valium\par \par Pt seen by new Pain  in [facility], Dr. Kevin Mc on 6/3/22. Per notes, agreed w/ Pain MGMT Dr. Avitia's current treatment plan, with consideration of titrating up Lyrica dosage and consider adding muscle relaxant if necessary. Recommended f/u with him PRN. Has f/u with Dr. Avitia 7/7\par \par Pt seen by Neuro Optho today for strabismus, ocular rosacea, headaches plans for MRI brain/eyes. Provider recommended Neuromuscular specialist to r/o neurological cause of AMPS (Amplified musculoskeletal pain syndrome) and occasional weakness. Pt waiting for specialist recommendations.\par

## 2022-06-21 NOTE — PHYSICAL EXAM
[FreeTextEntry1] : Medical assistant present for duration of physical examination\par \par General no acute distress, alert and oriented\par Psych pleasant, in good spirits\par Nonlabored breathing\par Ambulating without assistance\par Skin no skin changes\par \par Anorectal Exam:\par Inspection posterior midline fissurectomy site is well healed with epithelialized wound, no anal fissure seen, no external hemorrhoidal inflammation\par MOHINEDR nontender, no masses palpated, no blood on gloved finger\par \par Procedure: Anoscopy\par \par Pre procedure Diagnosis: anal spasm\par Post procedure Diagnosis:  anal spasm\par Anesthesia: none\par Estimated blood loss: none\par Specimen: none\par Complications: none\par \par Consent obtained. Anoscopy was performed by passing a lighted anoscope with lubricant jelly into the anal canal and the entire anal mucosal surface was inspected. Findings included no anal fissure or hemorrhoidal inflammation\par \par Patient tolerated examination and procedure well.\par \par \par

## 2022-06-27 ENCOUNTER — APPOINTMENT (OUTPATIENT)
Dept: OPHTHALMOLOGY | Facility: CLINIC | Age: 22
End: 2022-06-27
Payer: MEDICAID

## 2022-06-27 ENCOUNTER — NON-APPOINTMENT (OUTPATIENT)
Age: 22
End: 2022-06-27

## 2022-06-27 PROCEDURE — 92002 INTRM OPH EXAM NEW PATIENT: CPT

## 2022-07-13 ENCOUNTER — APPOINTMENT (OUTPATIENT)
Dept: RHEUMATOLOGY | Facility: CLINIC | Age: 22
End: 2022-07-13

## 2022-07-13 VITALS
HEART RATE: 82 BPM | WEIGHT: 131.5 LBS | SYSTOLIC BLOOD PRESSURE: 119 MMHG | TEMPERATURE: 98.6 F | BODY MASS INDEX: 23.01 KG/M2 | HEIGHT: 63.5 IN | DIASTOLIC BLOOD PRESSURE: 82 MMHG | OXYGEN SATURATION: 100 %

## 2022-07-13 DIAGNOSIS — R76.8 OTHER SPECIFIED ABNORMAL IMMUNOLOGICAL FINDINGS IN SERUM: ICD-10-CM

## 2022-07-13 PROCEDURE — 99204 OFFICE O/P NEW MOD 45 MIN: CPT | Mod: 25

## 2022-07-13 PROCEDURE — 36415 COLL VENOUS BLD VENIPUNCTURE: CPT

## 2022-07-13 NOTE — ASSESSMENT
[FreeTextEntry1] : This is a 22-year-old woman she has had longstanding musculoskeletal complaints chronic pain syndrome as well as fatigue she is also had gastrointestinal symptoms and urinary symptoms again dating back a number of years she is concerned she could have an autoimmune process she does have 1 sister who has JESSICA she has another sister has Crohn's disease both on her infusions she did have extensive blood work done and this time she has a positive JUAN of 1-1 60 in January her JUAN was negative and last year in May 2021 JUAN was negative as was other serologies she has gone for physical therapy which she reports makes symptoms worse she does take meloxicam she does take Lyrica she thinks these may help-I did explain to her that the currently positive JUAN is often very nonspecific can be seen in normal healthy individuals may not be related to her musculoskeletal symptoms it is also seen in first-degree relatives of individuals with family members who have autoimmune disease it had previously been negative on 2 recent occasions I did suggest that we obtain an avise - ctd -this is a specific profile test which can identify other autoantibodies associated with other autoimmune conditions as well as evidence for complement bound proteins which can be seen in lupus profile will also include SSA SSB with her history of dry eye dry mouth syndrome bloods will be drawn in the office today I will be contacting her when completed and we will review these results with her

## 2022-07-13 NOTE — HISTORY OF PRESENT ILLNESS
[FreeTextEntry1] : This is a 22-year-old woman she does have a complex past medical history including widespread pain musculoskeletal in nature headaches she has had skin rashes diagnosed as rosacea on biopsy she has had a dry eye dry mouth syndrome although apparently is able to make tearsShe has had eye puffiness in the morning she has had episodes of double vision she has not had any pulmonary issues but has had chronic gastrointestinal issues chronic rectal issues as well as urinary issues poor tolerance of medications from a GI standpoint her appetite has been poor but her weight has been stable she has never had any blood clots or phlebitis she does get episodes of anemia episodes of fevers episodes of feeling very hot and cold she has had irregular periods she has had significant pain as well as reflux issues additional symptoms which she describes is severe fatigue and pain which is widespread she has had urinary retention bowel issues she has chronic nausea abdominal pain bloating severe constipation she has had rectal bleeding diarrhea or headaches she has had eye pain knee pain she does have dry eyes and dry mouth she has ice craving she has severe dry eyes and weakness she gets double vision and abnormal eye motions she gets lightheaded and dizziness she is again has had iron deficiency anemia she has had rosacea she has had scoliosis again she is on Lyrica and meloxicam which does help somewhat she prescribes physical therapy is making things worse-there has been discussion apparently of going to the [facility] for a and detailed evaluation however she reports that they do not accept her insurance

## 2022-07-13 NOTE — DATA REVIEWED
[FreeTextEntry1] : Extensive notes regarding symptoms have been reviewed as well as laboratory studies most recently studies done by neurology and include a positive JUAN of 1-1 60 speckled additional labs on her computer recently have included normal CBC hemoglobin was 13.9 ferritin was 61 apparently she had previously had iron deficiency but has received infusions her CPK was 90 her aldolase was normal at 4.6 hepatitis C was negative ESR was normal at 6 TSH was normal at 1.2 striated muscle antibodies were negative acetylcholine antibodies were negative chemistries were normal previous JUAN in January 27, 2022 was negative and JUAN done a year ago May 26, 2021 was also negative additional bloods done in May 26, 2021 by rheumatology at [facility] included negative thyroid antibodies normal TSH Johnna 1 was negative C3 and C4 were negative and ANCA was negative

## 2022-07-13 NOTE — PHYSICAL EXAM
[General Appearance - Alert] : alert [General Appearance - In No Acute Distress] : in no acute distress [] : normal voice and communication [Sclera] : the sclera and conjunctiva were normal [Edema] : there was no peripheral edema [FreeTextEntry1] : No synovitis  strength appears normal no muscle weakness

## 2022-07-14 ENCOUNTER — TRANSCRIPTION ENCOUNTER (OUTPATIENT)
Age: 22
End: 2022-07-14

## 2022-07-14 NOTE — ASU PATIENT PROFILE, ADULT - NSICDXPASTSURGICALHX_GEN_ALL_CORE_FT
PAST SURGICAL HISTORY:  H/O hernia repair umbilical    S/P biopsy face    S/P colonoscopy     S/P endoscopy     Status post anal fissurectomy

## 2022-07-14 NOTE — ASU PATIENT PROFILE, ADULT - NSICDXPASTMEDICALHX_GEN_ALL_CORE_FT
PAST MEDICAL HISTORY:  Acid reflux     Amplified musculoskeletal pain syndrome     JUAN positive     Anemia     Arthralgia of both knees     Asthma     Constipation     H/O fibromyalgia     H/O headache     History of nausea     IBS (irritable bowel syndrome)     Rosacea     Severe anxiety

## 2022-07-14 NOTE — ASU PATIENT PROFILE, ADULT - NS PREOP UNDERSTANDS INFO
Solids until 12am, clears until 5am; PCR 3/21/22 @ [facility]/yes leave valuables/jewelry/contacts at home, make sure to have escort 18+, bring photo ID, insurance card + covid vax card, no solid foods after midnight, water/apple juice/gatorade until 0800/yes

## 2022-07-15 ENCOUNTER — APPOINTMENT (OUTPATIENT)
Dept: COLORECTAL SURGERY | Facility: CLINIC | Age: 22
End: 2022-07-15

## 2022-07-15 ENCOUNTER — TRANSCRIPTION ENCOUNTER (OUTPATIENT)
Age: 22
End: 2022-07-15

## 2022-07-15 ENCOUNTER — OUTPATIENT (OUTPATIENT)
Dept: OUTPATIENT SERVICES | Facility: HOSPITAL | Age: 22
LOS: 1 days | Discharge: HOME | End: 2022-07-15

## 2022-07-15 VITALS
HEIGHT: 64 IN | TEMPERATURE: 98 F | HEART RATE: 68 BPM | DIASTOLIC BLOOD PRESSURE: 72 MMHG | OXYGEN SATURATION: 100 % | WEIGHT: 132.28 LBS | RESPIRATION RATE: 16 BRPM | SYSTOLIC BLOOD PRESSURE: 121 MMHG

## 2022-07-15 VITALS
SYSTOLIC BLOOD PRESSURE: 122 MMHG | DIASTOLIC BLOOD PRESSURE: 72 MMHG | RESPIRATION RATE: 18 BRPM | OXYGEN SATURATION: 100 % | TEMPERATURE: 99 F | HEART RATE: 81 BPM

## 2022-07-15 DIAGNOSIS — Z98.890 OTHER SPECIFIED POSTPROCEDURAL STATES: Chronic | ICD-10-CM

## 2022-07-15 PROBLEM — R76.8 OTHER SPECIFIED ABNORMAL IMMUNOLOGICAL FINDINGS IN SERUM: Chronic | Status: ACTIVE | Noted: 2022-07-14

## 2022-07-15 PROCEDURE — 46505 CHEMODENERVATION ANAL MUSC: CPT | Mod: GC

## 2022-07-15 RX ORDER — ACETAMINOPHEN 500 MG
1000 TABLET ORAL ONCE
Refills: 0 | Status: DISCONTINUED | OUTPATIENT
Start: 2022-07-15 | End: 2022-07-15

## 2022-07-15 RX ORDER — SODIUM CHLORIDE 9 MG/ML
1000 INJECTION, SOLUTION INTRAVENOUS
Refills: 0 | Status: DISCONTINUED | OUTPATIENT
Start: 2022-07-15 | End: 2022-07-15

## 2022-07-15 RX ORDER — ACETAMINOPHEN 500 MG
650 TABLET ORAL ONCE
Refills: 0 | Status: DISCONTINUED | OUTPATIENT
Start: 2022-07-15 | End: 2022-07-15

## 2022-07-15 NOTE — ASU DISCHARGE PLAN (ADULT/PEDIATRIC) - CALL YOUR DOCTOR IF YOU HAVE ANY OF THE FOLLOWING:
Bleeding that does not stop/Swelling that gets worse/Fever greater than (need to indicate Fahrenheit or Celsius)/Wound/Surgical Site with redness, or foul smelling discharge or pus/Numbness, tingling, color or temperature change to extremity/Nausea and vomiting that does not stop/Excessive diarrhea/Inability to tolerate liquids or foods

## 2022-07-15 NOTE — ASU DISCHARGE PLAN (ADULT/PEDIATRIC) - NS MD DC FALL RISK RISK
For information on Fall & Injury Prevention, visit: [email or web address] OR  [email or web address] OR  https://www.cdc.gov/steadi/patient.html

## 2022-07-15 NOTE — BRIEF OPERATIVE NOTE - OPERATION/FINDINGS
Patient laid prone. Anal area prepped and draped in sterile fashion. Exam revealed no anal fissures and MOHINDER revealed internal hemorrhoids. Local anesthetic injected into 4 quadrants and submucosal anesthetic injected. Pudendal nerve block performed. Botox directed into sphincter muscle at 12, 3, 6, and 9 o'clock positions. Patient tolerated procedure well.

## 2022-07-15 NOTE — ASU DISCHARGE PLAN (ADULT/PEDIATRIC) - CARE PROVIDER_API CALL
Marquita Marquez)  ColonRectal Surgery; Surgery  [address] [facility], [address]nd [address]  [place], [place] [address]  Phone: [phone]  Fax: [phone]  Follow Up Time: 1 month

## 2022-07-21 ENCOUNTER — NON-APPOINTMENT (OUTPATIENT)
Age: 22
End: 2022-07-21

## 2022-07-21 LAB — AVISE CTD: NORMAL

## 2022-07-25 ENCOUNTER — APPOINTMENT (OUTPATIENT)
Dept: OPHTHALMOLOGY | Facility: CLINIC | Age: 22
End: 2022-07-25

## 2022-08-23 ENCOUNTER — APPOINTMENT (OUTPATIENT)
Dept: COLORECTAL SURGERY | Facility: CLINIC | Age: 22
End: 2022-08-23

## 2022-08-23 VITALS
SYSTOLIC BLOOD PRESSURE: 110 MMHG | HEART RATE: 77 BPM | HEIGHT: 63.5 IN | DIASTOLIC BLOOD PRESSURE: 75 MMHG | BODY MASS INDEX: 23.62 KG/M2 | WEIGHT: 135 LBS | TEMPERATURE: 97.9 F

## 2022-08-23 PROCEDURE — 46600 DIAGNOSTIC ANOSCOPY SPX: CPT

## 2022-08-23 NOTE — HISTORY OF PRESENT ILLNESS
[FreeTextEntry1] : 21 yo F presents for f/u anal pain\par h/o anal fissure, s/p fissurectomy w/ botox 3/25/22 w/ resolution of anal fissure on exam\par Due to persistent anal spasm and pain, pt underwent botox injections on 7/15/2022\par \par h/o IBS, chronic constipation, anxiety (h/o constipation on oral pill, now w/ Ketamine infusions and Latuda), pelvic floor dysfunction, fibromyalgia, chronic fatigue, generalized weakness (followed by Pain management and Rheum as needed, on Lyrica and meloxicam), followed by physiatrist for pelvic floor dysfunction, s/p PT for over 1 year w/o improvement, nausea (on odansetron, gas x)\par PSH umbilical hernia repair age 7\par h/o anemia s/p iron infusions last received 2/2022 \par \par Pt reports great improvement in rectal pain since Botox injection and no longer has rectal spasms. She continues Diazepam rectal suppositories (prescribed by physiatrist). Pt reports h/o 102.5 F a few days after botox injection. Had onset of facial pain at this time. Reports she has since developed diminished or absent reflexes, facial pain and drooping of left side of face as well as double vision, migraines and now on sumatriptan PRN. Per pt, PCP believes may be trigeminal neuralgia. Pt is waiting to be evaluated by [facility] for neuro work up\par \par Reports her constipation has worsened. Previously had relief/diarrhea on Linzess and Motegrity and was advised by covering GI to take half of bowel prep two weeks ago. She was advised by GI to take bisacodyl suppository which she took last night and passed "rocks."\par \par She continues lyrica, meloxicam. Denies use of Percocet or tramadol\par \par Of note, has recurrent UTI and currently on Cipro 250 mg BID x 14 days. Pt admits to feeling pressure on bladder and has pressure in rectum  and feels "something is blocking it." Pt reports burning, cut like sharp pain since using suppository. Pt worried about return of fissure\par Of note, pt has been followed by pelvic floor PT who advised cannot offer further treatment until gets a diagnosis. Also reports may have and (+) JUAN. \par

## 2022-08-23 NOTE — PHYSICAL EXAM
[FreeTextEntry1] : Medical assistant present for duration of physical examination\par \par General no acute distress, alert and oriented\par Psych pleasant, in good spirits\par Nonlabored breathing\par Ambulating without assistance\par Skin no skin changes\par \par Anorectal Exam:\par Inspection no anal fissure seen, mild hemorrhoidal inflammation\par MOHINDER nontender, no masses palpated, no blood on gloved finger\par \par Procedure: Anoscopy\par \par Pre procedure Diagnosis: anal spasm\par Post procedure Diagnosis:  anal spasm\par Anesthesia: none\par Estimated blood loss: none\par Specimen: none\par Complications: none\par \par Consent obtained. Anoscopy was performed by passing a lighted anoscope with lubricant jelly into the anal canal and the entire anal mucosal surface was inspected. Findings included no anal fissure, mild hemorrhoidal inflammation\par \par Patient tolerated examination and procedure well.\par \par \par

## 2022-10-13 ENCOUNTER — NON-APPOINTMENT (OUTPATIENT)
Age: 22
End: 2022-10-13

## 2022-10-21 ENCOUNTER — APPOINTMENT (OUTPATIENT)
Dept: COLORECTAL SURGERY | Facility: CLINIC | Age: 22
End: 2022-10-21

## 2022-12-13 ENCOUNTER — APPOINTMENT (OUTPATIENT)
Dept: COLORECTAL SURGERY | Facility: CLINIC | Age: 22
End: 2022-12-13

## 2022-12-13 VITALS
HEIGHT: 63.5 IN | HEART RATE: 80 BPM | WEIGHT: 135 LBS | TEMPERATURE: 98.6 F | BODY MASS INDEX: 23.62 KG/M2 | SYSTOLIC BLOOD PRESSURE: 137 MMHG | DIASTOLIC BLOOD PRESSURE: 83 MMHG

## 2022-12-13 PROCEDURE — 46600 DIAGNOSTIC ANOSCOPY SPX: CPT

## 2022-12-13 NOTE — ASSESSMENT
[FreeTextEntry1] : Continue diltiazem/lidocaine compound for anal fissure.\par She is pending multidisciplinary evaluation at [facility] in January 2023.\par F/u after she returns to [place] to discuss updates and treatment plan from specialists at [facility].\par All questions were answered, patient expressed understanding, and is agreeable to this plan.\par

## 2022-12-13 NOTE — HISTORY OF PRESENT ILLNESS
[FreeTextEntry1] : 23 yo F presents for f/u anal pain\par h/o anal fissure, s/p fissurectomy w/ botox 3/25/22 w/ resolution of anal fissure on exam\par Due to persistent anal spasm and pain, pt underwent botox injections on 7/15/2022\par \par h/o IBS, chronic constipation, anxiety (h/o constipation on oral pill, now w/ Ketamine infusions and Latuda), pelvic floor dysfunction, fibromyalgia, chronic fatigue, generalized weakness (followed by Pain management and Rheum as needed, on Lyrica and meloxicam), followed by physiatrist for pelvic floor dysfunction, s/p PT for over 1 year w/o improvement, nausea (on odansetron, gas x)\par PSH umbilical hernia repair age 7\par h/o anemia s/p iron infusions last received 2/2022 \par \par Most recently seen 8/23/22, Pt reported great improvement in rectal pain since Botox injection and no longer had rectal spasms. She continues Diazepam rectal suppositories (prescribed by physiatrist). \par Continues Lyrica, meloxicam. Denies use of Percocet or tramadol. Pt reports h/o 102.5 F a few days after botox injection. Had onset of facial pain at this time. Reports she has since developed diminished or absent reflexes, facial pain and drooping of left side of face as well as double vision, migraines and now on sumatriptan PRN. Per pt, PCP believes may be trigeminal neuralgia. Pt at that time was awaiting to be evaluated by [facility] for neuro work up. It was discussed botox injection can be considered as part of her management of anal spasm, performed on a serial basis every 3 months. Patient scheduled then cancelled procedure pending her evaluation at [facility]\par \par Returns today in follow up\par She states she is going to [facility] in January 2023 for multiple evaluations, including Medicine, \par Urogyn- on chronic keflex for UTIs, Neuro, Ophthal, Chronic fatigue specialist, GI\par \par She noted her BRBPR improved since last visit, until she started having BRBPR every 2 days this past month in toilet bowl\par Iron level is normal\par Had rectal pain as well, not necessarily at same time as BRB\par BMs are variable, sometimes constipated, but also notes she feels like she might have incontinence. \par Taking Calm magnesium powder, Linzess and Motegrity daily - having a BM daily on this regimen. \par \par No longer using diazepam suppository. Started using diltiazem compound topically again when she started seeing BRB. \par Taking Klonopin and had Ketamine infusion.

## 2022-12-13 NOTE — PHYSICAL EXAM
[FreeTextEntry1] : Medical assistant present for duration of physical examination\par \par General no acute distress, alert and oriented\par Psych pleasant, in good spirits\par Nonlabored breathing\par Ambulating without assistance\par Skin no skin changes\par \par Anorectal Exam:\par Inspection posterior midline anal fissure with inflammation seen, mild skin irritation/inflammation\par MOHINDER nontender, no masses palpated, no blood on gloved finger\par \par Procedure: Anoscopy\par \par Pre procedure Diagnosis: anal fissure\par Post procedure Diagnosis:  anal fissure\par Anesthesia: none\par Estimated blood loss: none\par Specimen: none\par Complications: none\par \par Consent obtained. Anoscopy was performed by passing a lighted anoscope with lubricant jelly into the anal canal and the entire anal mucosal surface was inspected. Findings included posterior midline anal fissure, mild hemorrhoids\par \par Patient tolerated examination and procedure well.\par \par \par

## 2023-02-09 ENCOUNTER — APPOINTMENT (OUTPATIENT)
Dept: RHEUMATOLOGY | Facility: CLINIC | Age: 23
End: 2023-02-09

## 2023-02-09 ENCOUNTER — APPOINTMENT (OUTPATIENT)
Dept: COLORECTAL SURGERY | Facility: CLINIC | Age: 23
End: 2023-02-09
Payer: MEDICAID

## 2023-02-09 VITALS
HEIGHT: 63.5 IN | BODY MASS INDEX: 23.45 KG/M2 | WEIGHT: 134 LBS | SYSTOLIC BLOOD PRESSURE: 119 MMHG | DIASTOLIC BLOOD PRESSURE: 79 MMHG | TEMPERATURE: 98.5 F | HEART RATE: 82 BPM

## 2023-02-09 PROCEDURE — 46600 DIAGNOSTIC ANOSCOPY SPX: CPT

## 2023-02-09 NOTE — ASSESSMENT
[FreeTextEntry1] : Outside records from [facility] reviewed.\par Had botox injection anal sphincter 1/20/23, complicated by respiratory distress, RRT, ICU stay.\par She complains of continued shortness of breath.\par She is pending follow up with multiple specialty providers in [facility] to continue the care plan recommended by [facility] locally.\par Recommend she follow up with GI, pulm and ENT evaluations. \par Can consider serial botox injection [id number] if optimized and cleared for OR ([facility] instead of ambulatory surgery hospital due to recent postoperative events that occurred at [facility])\par F/u 6 weeks\par All questions were answered, patient expressed understanding, and is agreeable to this plan.\par

## 2023-02-09 NOTE — HISTORY OF PRESENT ILLNESS
She takes melatonin 1 mg gummi Zarbee's - taking only two per night [FreeTextEntry1] : 21 yo F presents for f/u anal pain\par h/o anal fissure, s/p fissurectomy w/ botox 3/25/22 w/ resolution of anal fissure on exam\par Due to persistent anal spasm and pain, pt underwent botox injections on 7/15/2022\par \par h/o IBS, chronic constipation, anxiety (h/o constipation on oral pill, now w/ Ketamine infusions and Latuda), pelvic floor dysfunction, fibromyalgia, chronic fatigue, generalized weakness (followed by Pain management and Rheum as needed, on Lyrica and meloxicam), followed by physiatrist for pelvic floor dysfunction, s/p PT for over 1 year w/o improvement, nausea (on odansetron, gas x)\par PSH umbilical hernia repair age 7\par h/o anemia s/p iron infusions last received 2/2022 \par \par \par Seen 8/23/22, Pt reported great improvement in rectal pain since Botox injection and no longer had rectal spasms. She continues Diazepam rectal suppositories (prescribed by physiatrist). \par Continues Lyrica, meloxicam. Denies use of Percocet or tramadol. Pt reports h/o 102.5 F a few days after botox injection. Had onset of facial pain at this time. Reports she has since developed diminished or absent reflexes, facial pain and drooping of left side of face as well as double vision, migraines and now on sumatriptan PRN. Per pt, PCP believes may be trigeminal neuralgia. Pt at that time was awaiting to be evaluated by [facility] for neuro work up. It was discussed botox injection can be considered as part of her management of anal spasm, performed on a serial basis every 3 months. Patient scheduled then cancelled procedure pending her evaluation at [facility]\par \par Last seen 12/13/22 and pt stated plans to go to [facility] January 2023 including visits w/ Medicine, UROGYN (on chronic Keflex for UTIs), Neuro, Opthal, Chronic fatigue syndrome specialist and GI\par Pt reported had improvement in bleeding until it began every two days the past month. Admitted to some intermittent rectal pain and variable BMs. Pt having daily BM on Calm Magnesium powder, Linzess and Motegrity daily. Pt has stopped using diazepam suppository and had been using diltiazem compound since seeing bleeding.\par Continued Klonopin and Ketamine infusion.\par Exam noted posterior midline anal fissure w/ inflammation, mild skin irritation/inflammation. MOHINDER nontender. Anoscopy noted posterior midline fissure and mild hemorrhoids\par Recommended to continue Diltiazem/Lidocaine compound, f/u after [facility] evaluations to discuss treatment plans\par \par Outside records reviewed:\par Hospital discharge paperwork reviewed:\par \par Pt underwent anal sphincter botox under general anesthesia on 1/20/23, c/b respiratory distress, prompting RRT and BiPAP w/ epi, steroids and continuous nebulizers, etioloty thought to be bronchospasm, stat labs showed respiratory alkalosis and elevated lactate to 9.3, transferred to ICU and weaned to HFNC. Difficulty weaning to NC due to subjective respiratory distress. No hypoxia on hemodynamic assessment. ENT consulted and performed laryngoscopy which was otherwise normal\par CT neck no air trapping or tracheobronchomalacia. Pulm consulted and recommended Breo Ellipta and PRN nebulizers, followed by PFTs in outpatient setting. Multiple episodes of tachypnea when weaning to room air. Psych consulted, but pt felt her anxiety was well controlled so no new recommendations were made. \par \par [facility] recommendations reviewed:\par Advised nausea and incontinence and fissures are interconnected w/ her constipation. \par Delayed gastric emptying. Per pt which is why recommended intubation for botox procedure.\par \par She is a candidate for two week program to address constipation, but was recommended to have fissures healed first. No follow up w/ [place] at present, but potential to be part of April study pending further work up of pulmonary/bronchospasm\par \par CRS consult for management and resolution of fissure\par Eventual PT, pelvic floor retraining upon healing of fissures\par Short term recommendation of colon cleanse w/ 238 grams Miralax and 64 oz gatorade\par Resume bowel regimen\par Continue upper GI medciation of Zofran PRN and cyproheptadine\par Additional nausea medication recommendations to consider desipramine, aprepitant, Granisetron, olazapine\par \par [facility] who switched nausea medication from Zofran to Granisetron. Has f/u with [facility] on 3/3, who is moving away. Pt will establish care w/ new GI at [facility] in May.\par \par Pt reports constipation worsened early January and had to stop her medications two days prior to gastric emptying study. She didn't have a BM for a few days and at some pt had severe pain with BM. She was restarted on Linzess and Motegrity while at [facility], but reports constipation never fully resolved. c/o having a second fissure and was started on Nifedpine per CRS at [facility]. Reports BRB w/ every BM since January sometimes mixed w/ stool and on TP. Admits some days its difficult to tell if bleeding related to menses or constipation, but admits to passing hard balls of stool lately.\par \par She has since restarted her bowel regimen and restarted Magnesium on 1/24 but c/o fecal urgency and has "10 seconds" to get to the toilet she's been avoided leaving the home. This past week she didn't have a BM for 5 days. Moved her bowel today and experienced sharp pain. She continues Nifedipine compound twice daily. Currently feeling internal rectal pain (sharp/pressure), denies burning.\par \par \par Following liquid, pureed diet including 3 protein shakes, some potatoes, soup (pea or vegetable). Trying to drink more fluids\par Not using diazepam suppositories. Not using lidocaine. She continues Lyrica and Meloxicam for pain. \par Per pt, Derm started her on amitriptyline and ketamine cream for her left sided facial droop. \par

## 2023-02-09 NOTE — PHYSICAL EXAM
[FreeTextEntry1] : Medical assistant present for duration of physical examination\par \par General no acute distress, alert and oriented\par Psych responding appropriately to questions\par Nonlabored breathing\par Ambulating without assistance\par Skin no skin changes\par \par Anorectal Exam:\par Inspection posterior midline anal fissure, small inflamed associated tag/pile\par MOHINDER nontender, no masses palpated, no blood on gloved finger\par \par Procedure: Anoscopy\par \par Pre procedure Diagnosis: anal fissure\par Post procedure Diagnosis:  anal fissure\par Anesthesia: none\par Estimated blood loss: none\par Specimen: none\par Complications: none\par \par Consent obtained. Anoscopy was performed by passing a lighted anoscope with lubricant jelly into the anal canal and the entire anal mucosal surface was inspected. Findings included posterior midline anal fissure, mild hemorrhoids\par \par Patient tolerated examination and procedure well.\par \par \par

## 2023-02-10 RX ORDER — LIDOCAINE 5 G/100G
5 OINTMENT TOPICAL
Qty: 1 | Refills: 3 | Status: ACTIVE | COMMUNITY
Start: 2023-02-10 | End: 1900-01-01

## 2023-02-22 ENCOUNTER — APPOINTMENT (OUTPATIENT)
Dept: INTERNAL MEDICINE | Facility: CLINIC | Age: 23
End: 2023-02-22

## 2023-03-23 ENCOUNTER — APPOINTMENT (OUTPATIENT)
Dept: COLORECTAL SURGERY | Facility: CLINIC | Age: 23
End: 2023-03-23
Payer: MEDICAID

## 2023-03-23 VITALS
BODY MASS INDEX: 22.4 KG/M2 | DIASTOLIC BLOOD PRESSURE: 80 MMHG | WEIGHT: 128 LBS | HEART RATE: 66 BPM | SYSTOLIC BLOOD PRESSURE: 116 MMHG | HEIGHT: 63.5 IN | TEMPERATURE: 98.1 F

## 2023-03-23 PROCEDURE — 46600 DIAGNOSTIC ANOSCOPY SPX: CPT

## 2023-03-23 NOTE — PHYSICAL EXAM
[FreeTextEntry1] : Medical assistant present for duration of physical examination\par \par General no acute distress, alert and oriented\par Psych responding appropriately to questions\par Nonlabored breathing\par Ambulating without assistance\par Skin no skin changes\par \par Anorectal Exam:\par Inspection posterior midline anal fissure, soft small external hemorrhoids, no thrombosis\par MOHINDER mild tenderness, no masses palpated, no blood on gloved finger\par \par Procedure: Anoscopy\par \par Pre procedure Diagnosis: anal fissure\par Post procedure Diagnosis: anal fissure\par Anesthesia: none\par Estimated blood loss: none\par Specimen: none\par Complications: none\par \par Consent obtained. Anoscopy was performed by passing a lighted anoscope with lubricant jelly into the anal canal and the entire anal mucosal surface was inspected. Findings included posterior midline anal fissure, mild hemorrhoids\par \par Patient tolerated examination and procedure well.\par \par

## 2023-03-23 NOTE — ASSESSMENT
[FreeTextEntry1] : H/o botox injection anal sphincter 1/20/23 at [facility], complicated by respiratory distress, RRT, ICU stay.\par Has established care with new PCP.\par Is transitioning care to a new GI.\par She is pending follow up with pulmonary.\par \par We discussed serial botox injection [id number] can be considered if optimized and cleared for OR ([facility] instead of ambulatory surgery hospital due to recent postoperative events that occurred at [facility]). Can also consider fissurectomy at time of botox injection.\par The nature of the procedure as well as risks and benefits were reviewed with the patient. Risk/benefits/alternatives discussed at length, including but not limited to pain, bleeding, infection, swelling, recurrent or continued symptoms, need for future surgery, and risk of alteration of bowel habits, such as seepage, fecal urgency and/or fecal (gas, liquid or solid) incontinence discussed, which may be temporary or permanent.\par Would need pulmonary clearance preop\par Continue constipation management with GI. Continue multidisciplinary care. \par \par

## 2023-03-23 NOTE — HISTORY OF PRESENT ILLNESS
[FreeTextEntry1] : 21 y/o F presents for follow up evaluation of anal pain \par H/o anal fissure, s/p fissurectomy w/botox 3/25/22, w/resolution of anal fissure on exam\par Due to persistent anal spasm and pain, pt underwent botox injections 7/15/22\par \par h/o IBS, chronic constipation, anxiety (h/o constipation on oral pill, now w/ Ketamine infusions and Latuda), pelvic floor dysfunction, fibromyalgia, chronic fatigue, generalized weakness (followed by Pain management and Rheum as needed, on Lyrica and meloxicam), followed by physiatrist for pelvic floor dysfunction, s/p PT for over 1 year w/o improvement, nausea (on ondansetron, gas x)\par PSH umbilical hernia repair age 7\par h/o anemia s/p iron infusions last received 2/2022 \par \par Last seen 12/13/22 and pt stated plans to go to [facility] January 2023 including visits w/ Medicine, UROGYN (on chronic Keflex for UTIs), Neuro, Opthal, Chronic fatigue syndrome specialist and GI\par Pt reported had improvement in bleeding until it began every two days the past month. Exam noted posterior midline anal fissure w/ inflammation, mild skin irritation/inflammation. MOHINDER nontender. Anoscopy noted posterior midline fissure and mild hemorrhoids. Recommended to continue Diltiazem/Lidocaine compound, f/u after [facility] evaluations to discuss treatment plans\par \par Outside records reviewed:\par Hospital discharge paperwork reviewed:\par \par Pt underwent anal sphincter botox under general anesthesia on 1/20/23, c/b respiratory distress, prompting RRT and BiPAP w/ epi, steroids and continuous nebulizers, etioloty thought to be bronchospasm, stat labs showed respiratory alkalosis and elevated lactate to 9.3, transferred to ICU and weaned to HFNC. Difficulty weaning to NC due to subjective respiratory distress. No hypoxia on hemodynamic assessment. ENT consulted and performed laryngoscopy which was otherwise normal\par CT neck no air trapping or tracheobronchomalacia. Pulm consulted and recommended Breo Ellipta and PRN nebulizers, followed by PFTs in outpatient setting. Multiple episodes of tachypnea when weaning to room air. Psych consulted, but pt felt her anxiety was well controlled so no new recommendations were made. \par \par [facility] recommendations reviewed:\par Advised nausea and incontinence and fissures are interconnected w/ her constipation. \par Delayed gastric emptying. Per pt which is why recommended intubation for botox procedure.\par \par Seen by primary GI at [place] who switched nausea medication from Zofran to Granisetron. Has f/u with GI at [place] on 3/3, who is moving away. Pt will establish care w/ new GI at [place] in May.\par \par Most recently seen in follow up 2/9/23, c/o having a second fissure and was started on NIfedipine per CRS at [facility]. Reports BRB w/every BM since Jan sometimes mixed with stool on TP. Pt has since restarted her bowel regimen and restarted Magnesium on 1/24/23, c/o fecal urgency. She continues to experience shortness of breath. Exam including anoscopy revealed, posterior midline anal fissure, small inflamed associated tag/pile. Mild hemorrhoids. Outside records from [facility] reviewed, pt recommended to f/u with multiple specialty providers in [facility] (GI, Pulm, ENT) to c/w care plan recommended by [facility]. Pt can consider serial botox injections q3 months if optimized and cleared for OR ([facility] instead of ambulatory hospital due to recent postoperative respiratory distress at [facility]). F/u 6 weeks. \par \par Last botox received 1/20/23\par \par Established care with new PCP yesterday at [place]. \par Current GI at [place] is moving to [place], seen March 3. She is establishing care with new provider there, seeing that doctor in May. Given a new prescription medication for nausea.\par She is referred to pulmonary medicine, but has not seen pulmonary yet in follow up. She has nebulizer, steroid inhaler and another inhaler. \par Not doing pelvic floor physical therapy because she "failed it"; [facility] has a program but patient says she cannot enroll while she has fissure. \par Lyrica dose was lowered due to fatigue, she has not followed up with pain management. She is in process to changing to a pain doctor at [place]. \par \par Still complaining of rectal pain and constipation. \par Still applying nifedipine and lidocaine.\par Reports itching and burning, lidocaine helps. \par BMs are 3-5 days per week. Remains on Linzess, Motegrity and Magnesium.

## 2023-04-05 ENCOUNTER — APPOINTMENT (OUTPATIENT)
Dept: PULMONOLOGY | Facility: CLINIC | Age: 23
End: 2023-04-05

## 2023-05-02 ENCOUNTER — TRANSCRIPTION ENCOUNTER (OUTPATIENT)
Age: 23
End: 2023-05-02

## 2023-05-02 VITALS
SYSTOLIC BLOOD PRESSURE: 123 MMHG | DIASTOLIC BLOOD PRESSURE: 84 MMHG | HEIGHT: 63.5 IN | TEMPERATURE: 98 F | WEIGHT: 126.99 LBS | OXYGEN SATURATION: 100 % | HEART RATE: 79 BPM | RESPIRATION RATE: 16 BRPM

## 2023-05-02 RX ORDER — DIAZEPAM 5 MG
5 TABLET ORAL
Qty: 0 | Refills: 0 | DISCHARGE

## 2023-05-02 RX ORDER — DEXAMETHASONE 0.4 MG/1
1 INSERT INTRACANALICULAR; OPHTHALMIC
Qty: 0 | Refills: 0 | DISCHARGE

## 2023-05-02 RX ORDER — ALPRAZOLAM 0.25 MG
0.5 TABLET ORAL
Qty: 0 | Refills: 0 | DISCHARGE

## 2023-05-02 RX ORDER — DIPHENHYDRAMINE HCL 50 MG
100 CAPSULE ORAL
Qty: 0 | Refills: 0 | DISCHARGE

## 2023-05-02 RX ORDER — AZELAIC ACID 0.2 G/G
1 CREAM CUTANEOUS
Qty: 0 | Refills: 0 | DISCHARGE

## 2023-05-02 RX ORDER — DIAZEPAM 5 MG
0 TABLET ORAL
Qty: 0 | Refills: 0 | DISCHARGE

## 2023-05-02 RX ORDER — POLYETHYLENE GLYCOL 3350 17 G/17G
17 POWDER, FOR SOLUTION ORAL
Qty: 0 | Refills: 0 | DISCHARGE

## 2023-05-02 RX ORDER — CYPROHEPTADINE HYDROCHLORIDE 4 MG/1
4 TABLET ORAL
Qty: 0 | Refills: 0 | DISCHARGE

## 2023-05-02 NOTE — ASU PATIENT PROFILE, ADULT - FALL HARM RISK - UNIVERSAL INTERVENTIONS
Bed in lowest position, wheels locked, appropriate side rails in place/Call bell, personal items and telephone in reach/Instruct patient to call for assistance before getting out of bed or chair/Non-slip footwear when patient is out of bed/[place] to call system/Physically safe environment - no spills, clutter or unnecessary equipment/Purposeful Proactive Rounding/Room/bathroom lighting operational, light cord in reach

## 2023-05-02 NOTE — ASU PREOP CHECKLIST - AICD PRESENT
no

## 2023-05-02 NOTE — ASU PATIENT PROFILE, ADULT - NSICDXPASTMEDICALHX_GEN_ALL_CORE_FT
PAST MEDICAL HISTORY:  Acid reflux     Amplified musculoskeletal pain syndrome     JUAN positive     Anemia     Arthralgia of both knees     Asthma     Bronchospasm     Constipation     Delayed gastric emptying     H/O fibromyalgia     H/O headache     History of nausea     IBS (irritable bowel syndrome)     Pelvic floor dysfunction     Rosacea     Severe anxiety      PAST MEDICAL HISTORY:  Acid reflux     Amplified musculoskeletal pain syndrome     JUAN positive     Anemia     Arthralgia of both knees     Asthma     Bronchospasm acute    Constipation     Delayed gastric emptying     H/O fibromyalgia     H/O headache     History of nausea     IBS (irritable bowel syndrome)     Pelvic floor dysfunction     Rosacea     Severe anxiety

## 2023-05-02 NOTE — ASU PATIENT PROFILE, ADULT - NSICDXPASTSURGICALHX_GEN_ALL_CORE_FT
PAST SURGICAL HISTORY:  Elective surgery anal botox injection 1/2023 in [facility], per pt she was intubated and was in ICU for 3 days after surgery    H/O hernia repair umbilical    S/P biopsy face    S/P colonoscopy     S/P endoscopy     Status post anal fissurectomy

## 2023-05-03 ENCOUNTER — TRANSCRIPTION ENCOUNTER (OUTPATIENT)
Age: 23
End: 2023-05-03

## 2023-05-03 ENCOUNTER — APPOINTMENT (OUTPATIENT)
Dept: COLORECTAL SURGERY | Facility: HOSPITAL | Age: 23
End: 2023-05-03

## 2023-05-03 ENCOUNTER — OUTPATIENT (OUTPATIENT)
Dept: OUTPATIENT SERVICES | Facility: HOSPITAL | Age: 23
LOS: 1 days | Discharge: HOME | End: 2023-05-03
Payer: COMMERCIAL

## 2023-05-03 ENCOUNTER — RESULT REVIEW (OUTPATIENT)
Age: 23
End: 2023-05-03

## 2023-05-03 DIAGNOSIS — Z41.9 ENCOUNTER FOR PROCEDURE FOR PURPOSES OTHER THAN REMEDYING HEALTH STATE, UNSPECIFIED: Chronic | ICD-10-CM

## 2023-05-03 DIAGNOSIS — Z98.890 OTHER SPECIFIED POSTPROCEDURAL STATES: Chronic | ICD-10-CM

## 2023-05-03 PROCEDURE — 46505 CHEMODENERVATION ANAL MUSC: CPT

## 2023-05-03 PROCEDURE — 88304 TISSUE EXAM BY PATHOLOGIST: CPT | Mod: 26

## 2023-05-03 PROCEDURE — 46200 REMOVAL OF ANAL FISSURE: CPT

## 2023-05-03 DEVICE — SURGICEL 4 X 8": Type: IMPLANTABLE DEVICE | Status: FUNCTIONAL

## 2023-05-03 RX ORDER — OXYCODONE HYDROCHLORIDE 5 MG/1
5 TABLET ORAL EVERY 4 HOURS
Refills: 0 | Status: DISCONTINUED | OUTPATIENT
Start: 2023-05-03 | End: 2023-05-04

## 2023-05-03 RX ORDER — NALOXONE HYDROCHLORIDE 4 MG/.1ML
4 SPRAY NASAL
Qty: 1 | Refills: 0
Start: 2023-05-03

## 2023-05-03 RX ORDER — IPRATROPIUM/ALBUTEROL SULFATE 18-103MCG
3 AEROSOL WITH ADAPTER (GRAM) INHALATION EVERY 6 HOURS
Refills: 0 | Status: DISCONTINUED | OUTPATIENT
Start: 2023-05-04 | End: 2023-05-04

## 2023-05-03 RX ORDER — ONDANSETRON 8 MG/1
1 TABLET, FILM COATED ORAL
Qty: 0 | Refills: 0 | DISCHARGE

## 2023-05-03 RX ORDER — BENZOCAINE AND MENTHOL 5; 1 G/100ML; G/100ML
1 LIQUID ORAL ONCE
Refills: 0 | Status: COMPLETED | OUTPATIENT
Start: 2023-05-03 | End: 2023-05-03

## 2023-05-03 RX ORDER — OXYCODONE HYDROCHLORIDE 5 MG/1
1 TABLET ORAL
Qty: 15 | Refills: 0
Start: 2023-05-03

## 2023-05-03 RX ORDER — IPRATROPIUM/ALBUTEROL SULFATE 18-103MCG
3 AEROSOL WITH ADAPTER (GRAM) INHALATION EVERY 6 HOURS
Refills: 0 | Status: DISCONTINUED | OUTPATIENT
Start: 2023-05-03 | End: 2023-05-03

## 2023-05-03 RX ORDER — HYDROMORPHONE HYDROCHLORIDE 2 MG/ML
0.5 INJECTION INTRAMUSCULAR; INTRAVENOUS; SUBCUTANEOUS
Refills: 0 | Status: DISCONTINUED | OUTPATIENT
Start: 2023-05-03 | End: 2023-05-04

## 2023-05-03 RX ORDER — SODIUM CHLORIDE 9 MG/ML
1000 INJECTION, SOLUTION INTRAVENOUS
Refills: 0 | Status: DISCONTINUED | OUTPATIENT
Start: 2023-05-03 | End: 2023-05-03

## 2023-05-03 RX ORDER — ACETAMINOPHEN 500 MG
1000 TABLET ORAL ONCE
Refills: 0 | Status: DISCONTINUED | OUTPATIENT
Start: 2023-05-03 | End: 2023-05-04

## 2023-05-03 RX ORDER — IPRATROPIUM/ALBUTEROL SULFATE 18-103MCG
3 AEROSOL WITH ADAPTER (GRAM) INHALATION ONCE
Refills: 0 | Status: COMPLETED | OUTPATIENT
Start: 2023-05-03 | End: 2023-05-03

## 2023-05-03 RX ORDER — ONDANSETRON 8 MG/1
4 TABLET, FILM COATED ORAL EVERY 4 HOURS
Refills: 0 | Status: COMPLETED | OUTPATIENT
Start: 2023-05-03 | End: 2023-05-04

## 2023-05-03 RX ORDER — LURASIDONE HYDROCHLORIDE 40 MG/1
20 TABLET ORAL DAILY
Refills: 0 | Status: DISCONTINUED | OUTPATIENT
Start: 2023-05-03 | End: 2023-05-04

## 2023-05-03 RX ORDER — SODIUM CHLORIDE 9 MG/ML
1000 INJECTION, SOLUTION INTRAVENOUS
Refills: 0 | Status: DISCONTINUED | OUTPATIENT
Start: 2023-05-03 | End: 2023-05-04

## 2023-05-03 RX ORDER — ONABOTULINUMTOXINA 100 UNIT
100 VIAL (EA) INJECTION ONCE
Refills: 0 | Status: DISCONTINUED | OUTPATIENT
Start: 2023-05-03 | End: 2023-05-03

## 2023-05-03 RX ADMIN — Medication 3 MILLILITER(S): at 22:12

## 2023-05-03 RX ADMIN — ONDANSETRON 4 MILLIGRAM(S): 8 TABLET, FILM COATED ORAL at 12:37

## 2023-05-03 RX ADMIN — BENZOCAINE AND MENTHOL 1 LOZENGE: 5; 1 LIQUID ORAL at 13:22

## 2023-05-03 RX ADMIN — Medication 200 MILLIGRAM(S): at 14:31

## 2023-05-03 RX ADMIN — ONDANSETRON 4 MILLIGRAM(S): 8 TABLET, FILM COATED ORAL at 22:12

## 2023-05-03 RX ADMIN — SODIUM CHLORIDE 100 MILLILITER(S): 9 INJECTION, SOLUTION INTRAVENOUS at 17:36

## 2023-05-03 RX ADMIN — OXYCODONE HYDROCHLORIDE 5 MILLIGRAM(S): 5 TABLET ORAL at 19:55

## 2023-05-03 RX ADMIN — OXYCODONE HYDROCHLORIDE 5 MILLIGRAM(S): 5 TABLET ORAL at 18:55

## 2023-05-03 RX ADMIN — Medication 3 MILLILITER(S): at 11:22

## 2023-05-03 NOTE — PRE-ANESTHESIA EVALUATION ADULT - NSANTHOSAYNRD_GEN_A_CORE
No. GABRIELLA screening performed.  STOP BANG Legend: 0-2 = LOW Risk; 3-4 = INTERMEDIATE Risk; 5-8 = HIGH Risk

## 2023-05-03 NOTE — BRIEF OPERATIVE NOTE - NSICDXBRIEFPOSTOP_GEN_ALL_CORE_FT
POST-OP DIAGNOSIS:  Anal spasm 03-May-2023 09:37:08  Enriqueta Barahona  Anal fissure 03-May-2023 09:37:17  Enriqueta Barahona

## 2023-05-03 NOTE — BRIEF OPERATIVE NOTE - OPERATION/FINDINGS
anorectal exam under anesthesia, local injected superficially and nerve block performed. anal fissure noted at 6oclock with epithelialization. scrapings took and nodule removed, area was cauterized. botox injected at 6oclock. surgicell packed and hemostasis was achieved anorectal exam under anesthesia, local block performed. anal fissure noted at posterior midline. currettage performed and papilla at base removed, area was cauterized. botox injected into sphincter muscle. surgicell packed and hemostasis was achieved

## 2023-05-03 NOTE — PROGRESS NOTE ADULT - SUBJECTIVE AND OBJECTIVE BOX
Surgery Post-Op Note    Procedure: EUA, fissurectomy, injection of botox    Diagnosis/Indication: Anal spasm/fissure    Surgeon: Dr. Marquez    S: Pt currently with worsened nausea with 1x episode of emesis. Also notes some throat irritation trigging her bronchospasms. Denies CP, SOB, calf tenderness.    O:  T(C): --  T(F): --  HR: 98 (05-03-23 @ 14:15) (88 - 98)  BP: 126/58 (05-03-23 @ 14:15) (111/70 - 126/58)  RR: 18 (05-03-23 @ 14:15) (16 - 20)  SpO2: 97% (05-03-23 @ 14:15) (94% - 100%)  Wt(kg): --            Gen: NAD, resting comfortably in bed  C/V: NSR  Pulm: Nonlabored breathing, no respiratory distress  Abd: soft, NT/ND  Extrem: WWP, no calf edema, SCDs in place

## 2023-05-03 NOTE — ASU DISCHARGE PLAN (ADULT/PEDIATRIC) - CARE PROVIDER_API CALL
Marquita Marquez)  ColonRectal Surgery; Surgery  [facility], [address]nd Floor  [place], [place] [address]  Phone: [phone]  Fax: [phone]  Established Patient  Follow Up Time: 2 weeks

## 2023-05-03 NOTE — PRE-ANESTHESIA EVALUATION ADULT - NSANTHPEFT_GEN_ALL_CORE
A&Ox3  lungs: clear but poor inspiratory effort  neck: no JVD, bruit, retraction. Normal ROM  cor: RRR S1 S2  extr: no c/c/e

## 2023-05-03 NOTE — BRIEF OPERATIVE NOTE - NSICDXBRIEFPROCEDURE_GEN_ALL_CORE_FT
PROCEDURES:  Exam under anesthesia, anus 03-May-2023 09:35:52  Enriqueta Barahona  Excision, fissure or fistula, anus 03-May-2023 09:36:11  Enriqueta Barahona  Injection of Botox into anus 03-May-2023 09:36:22  Enriqueta Barahona

## 2023-05-04 ENCOUNTER — TRANSCRIPTION ENCOUNTER (OUTPATIENT)
Age: 23
End: 2023-05-04

## 2023-05-04 VITALS
TEMPERATURE: 99 F | SYSTOLIC BLOOD PRESSURE: 103 MMHG | OXYGEN SATURATION: 98 % | RESPIRATION RATE: 17 BRPM | DIASTOLIC BLOOD PRESSURE: 68 MMHG | HEART RATE: 66 BPM

## 2023-05-04 PROBLEM — M62.89 OTHER SPECIFIED DISORDERS OF MUSCLE: Chronic | Status: ACTIVE | Noted: 2023-05-02

## 2023-05-04 RX ORDER — LANOLIN ALCOHOL/MO/W.PET/CERES
5 CREAM (GRAM) TOPICAL AT BEDTIME
Refills: 0 | Status: DISCONTINUED | OUTPATIENT
Start: 2023-05-04 | End: 2023-05-04

## 2023-05-04 RX ORDER — CEPHALEXIN 500 MG
0 CAPSULE ORAL
Qty: 0 | Refills: 0 | DISCHARGE
Start: 2023-05-04

## 2023-05-04 RX ORDER — APREPITANT 80 MG/1
40 CAPSULE ORAL
Refills: 0 | Status: DISCONTINUED | OUTPATIENT
Start: 2023-05-04 | End: 2023-05-04

## 2023-05-04 RX ORDER — ONDANSETRON 8 MG/1
4 TABLET, FILM COATED ORAL ONCE
Refills: 0 | Status: COMPLETED | OUTPATIENT
Start: 2023-05-04 | End: 2023-05-04

## 2023-05-04 RX ORDER — SUMATRIPTAN SUCCINATE 4 MG/.5ML
25 INJECTION, SOLUTION SUBCUTANEOUS DAILY
Refills: 0 | Status: DISCONTINUED | OUTPATIENT
Start: 2023-05-04 | End: 2023-05-04

## 2023-05-04 RX ORDER — CYPROHEPTADINE HYDROCHLORIDE 4 MG/1
4 TABLET ORAL EVERY 12 HOURS
Refills: 0 | Status: DISCONTINUED | OUTPATIENT
Start: 2023-05-04 | End: 2023-05-04

## 2023-05-04 RX ORDER — CEPHALEXIN 500 MG
500 CAPSULE ORAL
Refills: 0 | Status: DISCONTINUED | OUTPATIENT
Start: 2023-05-04 | End: 2023-05-04

## 2023-05-04 RX ORDER — CYPROHEPTADINE HYDROCHLORIDE 4 MG/1
1 TABLET ORAL
Qty: 0 | Refills: 0 | DISCHARGE
Start: 2023-05-04

## 2023-05-04 RX ADMIN — Medication 3 MILLILITER(S): at 13:24

## 2023-05-04 RX ADMIN — ONDANSETRON 4 MILLIGRAM(S): 8 TABLET, FILM COATED ORAL at 03:43

## 2023-05-04 RX ADMIN — ONDANSETRON 4 MILLIGRAM(S): 8 TABLET, FILM COATED ORAL at 10:14

## 2023-05-04 RX ADMIN — LURASIDONE HYDROCHLORIDE 20 MILLIGRAM(S): 40 TABLET ORAL at 00:49

## 2023-05-04 RX ADMIN — ONDANSETRON 4 MILLIGRAM(S): 8 TABLET, FILM COATED ORAL at 09:37

## 2023-05-04 RX ADMIN — OXYCODONE HYDROCHLORIDE 5 MILLIGRAM(S): 5 TABLET ORAL at 01:02

## 2023-05-04 RX ADMIN — Medication 3 MILLILITER(S): at 05:03

## 2023-05-04 RX ADMIN — OXYCODONE HYDROCHLORIDE 5 MILLIGRAM(S): 5 TABLET ORAL at 02:02

## 2023-05-04 RX ADMIN — Medication 5 MILLIGRAM(S): at 00:49

## 2023-05-04 NOTE — PROGRESS NOTE ADULT - ATTENDING COMMENTS
CRS Attending  Seen on morning rounds  Patient without complaints and would like to go home  POD1 s/p fissurectomy and botox injection, s/p observation   DC to home

## 2023-05-04 NOTE — PATIENT PROFILE ADULT - FALL HARM RISK - HARM RISK INTERVENTIONS

## 2023-05-04 NOTE — DISCHARGE NOTE NURSING/CASE MANAGEMENT/SOCIAL WORK - NSDCPEFALRISK_GEN_ALL_CORE
For information on Fall & Injury Prevention, visit: [email or web address] OR  [email or web address] OR  https://www.cdc.gov/steadi/patient.html

## 2023-05-04 NOTE — DISCHARGE NOTE NURSING/CASE MANAGEMENT/SOCIAL WORK - PATIENT PORTAL LINK FT
You can access the FollowMyHealth Patient Portal offered by [facility] by registering at the following website: [email or web address]. By joining [employer]’s FollowMyHealth portal, you will also be able to view your health information using other applications (apps) compatible with our system.

## 2023-05-04 NOTE — PROGRESS NOTE ADULT - SUBJECTIVE AND OBJECTIVE BOX
STATUS POST:  EUA, fissurectomy, injection of botox POD1     SUBJECTIVE: Patient seen and examined bedside by chief resident, +N baseline without vomiting, No acute complaints at this time. Denies SOB/COULTER/CP/Palpations/ difficulty breathing      Vital Signs Last 24 Hrs  T(C): 36.8 (04 May 2023 09:03), Max: 37.5 (04 May 2023 05:00)  T(F): 98.2 (04 May 2023 09:03), Max: 99.5 (04 May 2023 05:00)  HR: 67 (04 May 2023 09:03) (67 - 99)  BP: 116/68 (04 May 2023 09:03) (108/76 - 126/58)  BP(mean): 81 (04 May 2023 05:00) (81 - 88)  RR: 17 (04 May 2023 09:03) (16 - 18)  SpO2: 96% (04 May 2023 09:03) (94% - 100%)    Parameters below as of 04 May 2023 09:03  Patient On (Oxygen Delivery Method): room air      I&O's Detail    03 May 2023 07:01  -  04 May 2023 07:00  --------------------------------------------------------  IN:    Lactated Ringers: 400 mL    Lactated Ringers: 600 mL  Total IN: 1000 mL    OUT:    Voided (mL): 1275 mL  Total OUT: 1275 mL    Total NET: -275 mL          General: NAD, resting comfortably in bed  C/V: NSR  Pulm: Nonlabored breathing, no respiratory distress  Abd: soft, NT/ND.  No rebound or guarding  Extrem: WWP, no edema, SCDs in place        LABS:                RADIOLOGY & ADDITIONAL STUDIES:   STATUS POST:  EUA, fissurectomy, injection of botox POD1     ON:  restarted home latuda for anxiety, straight caths herself, neelam diet, improved nausea, IVHL     SUBJECTIVE: Patient seen and examined bedside by chief resident, +N baseline without vomiting, No acute complaints at this time. Denies SOB/COULTER/CP/Palpations/ difficulty breathing      Vital Signs Last 24 Hrs  T(C): 36.8 (04 May 2023 09:03), Max: 37.5 (04 May 2023 05:00)  T(F): 98.2 (04 May 2023 09:03), Max: 99.5 (04 May 2023 05:00)  HR: 67 (04 May 2023 09:03) (67 - 99)  BP: 116/68 (04 May 2023 09:03) (108/76 - 126/58)  BP(mean): 81 (04 May 2023 05:00) (81 - 88)  RR: 17 (04 May 2023 09:03) (16 - 18)  SpO2: 96% (04 May 2023 09:03) (94% - 100%)    Parameters below as of 04 May 2023 09:03  Patient On (Oxygen Delivery Method): room air      I&O's Detail    03 May 2023 07:01  -  04 May 2023 07:00  --------------------------------------------------------  IN:    Lactated Ringers: 400 mL    Lactated Ringers: 600 mL  Total IN: 1000 mL    OUT:    Voided (mL): 1275 mL  Total OUT: 1275 mL    Total NET: -275 mL          General: NAD, resting comfortably in bed  C/V: NSR  Pulm: Nonlabored breathing, no respiratory distress  Abd: soft, NT/ND.  No rebound or guarding  Extrem: WWP, no edema, SCDs in place        LABS:                RADIOLOGY & ADDITIONAL STUDIES:

## 2023-05-04 NOTE — PROGRESS NOTE ADULT - ASSESSMENT
22F IBS, dysmotility, anal spasm/fissure, hx of bronchospasm s/p anesthesia, presents electively for EUA, fissurectomy, injection of botox on 5/3, c/b bronchospasms and poor PO toleration in PACU. Tolerating diet, d/c home       23 hr obs  Reg diet/IVF  Pain/nausea control prn  OOBA/IS   
A/P: 22yFemale s/p above procedure  Diet: Reg diet  IVF: until tolerating  Pain/nausea control  DVT ppx: SCD/SQH  Dispo plan: Obs until ready for DC

## 2023-05-05 PROCEDURE — 46200 REMOVAL OF ANAL FISSURE: CPT

## 2023-05-05 PROCEDURE — C1889: CPT

## 2023-05-05 PROCEDURE — 94640 AIRWAY INHALATION TREATMENT: CPT

## 2023-05-05 PROCEDURE — 88304 TISSUE EXAM BY PATHOLOGIST: CPT

## 2023-05-05 PROCEDURE — 46505 CHEMODENERVATION ANAL MUSC: CPT

## 2023-05-18 LAB — SURGICAL PATHOLOGY STUDY: SIGNIFICANT CHANGE UP

## 2023-05-30 ENCOUNTER — APPOINTMENT (OUTPATIENT)
Dept: COLORECTAL SURGERY | Facility: CLINIC | Age: 23
End: 2023-05-30
Payer: MEDICAID

## 2023-05-30 VITALS
DIASTOLIC BLOOD PRESSURE: 77 MMHG | HEIGHT: 63.5 IN | WEIGHT: 124 LBS | TEMPERATURE: 98.4 F | BODY MASS INDEX: 21.7 KG/M2 | SYSTOLIC BLOOD PRESSURE: 125 MMHG | HEART RATE: 73 BPM

## 2023-05-30 PROBLEM — K30 FUNCTIONAL DYSPEPSIA: Chronic | Status: ACTIVE | Noted: 2023-05-02

## 2023-05-30 PROBLEM — J98.01 ACUTE BRONCHOSPASM: Chronic | Status: ACTIVE | Noted: 2023-05-02

## 2023-05-30 PROCEDURE — 46600 DIAGNOSTIC ANOSCOPY SPX: CPT | Mod: 58

## 2023-05-30 NOTE — PHYSICAL EXAM
[FreeTextEntry1] : Medical assistant present for duration of physical examination\par \par General no acute distress, alert and oriented\par Psych responding appropriately to questions\par Nonlabored breathing\par Ambulating without assistance\par Skin no skin changes\par \par Anorectal Exam:\par Inspection posterior midline fissurectomy site superficial appearing wound, clean based, no blood or exudate, soft small external hemorrhoids, no thrombosis\par MOHINDER nontender, no masses palpated, no blood on gloved finger\par \par Procedure: Anoscopy\par \par Pre procedure Diagnosis: s/p fissurectomy and botox \par Post procedure Diagnosis: s/p fissurectomy and botox \par Anesthesia: none\par Estimated blood loss: none\par Specimen: none\par Complications: none\par \par Consent obtained. Anoscopy was performed by passing a lighted anoscope with lubricant jelly into the anal canal and the entire anal mucosal surface was inspected. Findings included  posterior midline fissurectomy site, mild hemorrhoids\par \par Patient tolerated examination and procedure well. Monitored in office post examination. \par \par

## 2023-05-30 NOTE — ASSESSMENT
[FreeTextEntry1] : H/o botox injection anal sphincter 1/20/23 at [facility], complicated by respiratory distress, RRT, ICU stay.\par \par Most recently had fissurectomy and botox injection 5/3/23, complicated by postop nausea, vomiting and inability to tolerate PO, requiring inpatient observation postoperatively.\par \par Can continue serial botox injetion every 3 months, +/- fissurectomy, for symptoms as part of her multidisciplinary care.\par \par F/u 1 month

## 2023-05-30 NOTE — HISTORY OF PRESENT ILLNESS
[FreeTextEntry1] : 22 yo F presents for follow up\par \par H/o anal fissure, s/p fissurectomy w/botox 3/25/22, w/resolution of anal fissure on exam\par Due to persistent anal spasm and pain, pt underwent botox injections 7/15/22, 1/20/23 (at [facility]) and most recently 5/3/23\par \par h/o IBS, chronic constipation, anxiety (h/o constipation on oral pill, now w/ Ketamine infusions and Latuda), pelvic floor dysfunction, fibromyalgia, chronic fatigue, generalized weakness (followed by Pain management and Rheum as needed, on Lyrica and meloxicam), followed by physiatrist for pelvic floor dysfunction, s/p PT for over 1 year w/o improvement, nausea (on ondansetron, gas x)\par PSH umbilical hernia repair age 7\par h/o anemia s/p iron infusions last received 2/2022 \par \par Seen 12/13/22 , then at [facility] January 2023 including visits w/ Medicine, UROGYN (on chronic Keflex for UTIs), Neuro, Opthal, Chronic fatigue syndrome specialist and GI\par \par [facility] Hospital discharge paperwork reviewed:\par Pt underwent anal sphincter botox under general anesthesia on 1/20/23, c/b respiratory distress, prompting RRT and BiPAP w/ epi, steroids and continuous nebulizers, etiology thought to be bronchospasm, stat labs showed respiratory alkalosis and elevated lactate to 9.3, transferred to ICU and weaned to HFNC. Difficulty weaning to NC due to subjective respiratory distress. No hypoxia on hemodynamic assessment. ENT consulted and performed laryngoscopy which was otherwise normal\par CT neck no air trapping or tracheobronchomalacia. Pulm consulted and recommended Breo Ellipta and PRN nebulizers, followed by PFTs in outpatient setting. Multiple episodes of tachypnea when weaning to room air. Psych consulted, but pt felt her anxiety was well controlled so no new recommendations were made. \par \par [facility] recommendations reviewed:\par Advised nausea and incontinence and fissures are interconnected w/ her constipation. \par Delayed gastric emptying. Per pt which is why recommended intubation for botox procedure.\par \par Primary GI is at [place]. Established care w/ new GI at [place] earlier this month but did not like that GI. Seeing Dr Patel GI at [place] next week for first consultation - specialist in motility and gastric emptying per patient. \par \par Now s/p EUA with fissurectomy and Botox injection (5/3/23)\par \par Surgical Pathology Report - Auth (Verified)\par Specimen(s) Submitted\par 1  Anal fissure\par Final Diagnosis\par 1. Anal fissure:\par - Fibroepithelial polyp\par Note: This case was reviewed by another pathologist (BP) who concurs.\par Verified by: Carol Chiu MD\par \par Returns today for first postop visit. \par Admitted postop for nausea, vomiting and inability to tolerate PO in the post-anesthesia period.\par \par Her outpatient GI helped adjust her anti-nausea medications, with improvement and eventual return to baseline level of symptoms. \par Reports swallowing pills give her sensation of pill getting stuck in throat. \par \par Her daily calorie goal is 1000 calories now per GI, PCP and dietician. \par \par Reports continued pain with BMs, but also sometimes randomly throughout day. If she doesn't take 2 tsp of magnesium, she strains with worse pain, but consistency is more diarrhea than solid with 2 tsp. If she takes less than 2tsp then she gets constipated. \par \par Occasional BRB with BMs, once every 2 weeks, in toilet. \par Reports she is iron deficient with PCP blood work at [place], hematologist is planning to give iron infusion in 2 weeks. \par \par She reports she may be doing Smart Pill through GI at [place], scheduling pending consultation with new GI at [place].

## 2023-06-26 NOTE — BRIEF OPERATIVE NOTE - TYPE OF ANESTHESIA
Local Preparation Of Recipient Site - Flap Takedown: The eschar and granulation tissue was removed surgically with sharp dissection to facilitate appropriate healing after division and inset of the proximal and distal interpolation flap.

## 2023-06-28 ENCOUNTER — NON-APPOINTMENT (OUTPATIENT)
Age: 23
End: 2023-06-28

## 2023-06-28 ENCOUNTER — APPOINTMENT (OUTPATIENT)
Dept: COLORECTAL SURGERY | Facility: CLINIC | Age: 23
End: 2023-06-28
Payer: MEDICAID

## 2023-06-28 VITALS
DIASTOLIC BLOOD PRESSURE: 79 MMHG | BODY MASS INDEX: 20.47 KG/M2 | TEMPERATURE: 98.5 F | HEIGHT: 63.5 IN | HEART RATE: 75 BPM | WEIGHT: 117 LBS | SYSTOLIC BLOOD PRESSURE: 120 MMHG

## 2023-06-28 DIAGNOSIS — M62.89 OTHER SPECIFIED DISORDERS OF MUSCLE: ICD-10-CM

## 2023-06-28 PROCEDURE — 46600 DIAGNOSTIC ANOSCOPY SPX: CPT | Mod: 58

## 2023-06-29 PROBLEM — M62.89 PELVIC FLOOR DYSFUNCTION IN FEMALE: Status: ACTIVE | Noted: 2022-03-31

## 2023-06-29 NOTE — HISTORY OF PRESENT ILLNESS
[FreeTextEntry1] : 24 yo F presents for follow up\par \par H/o anal fissure, s/p fissurectomy w/botox 3/25/22, w/resolution of anal fissure on exam\par Due to persistent anal spasm and pain, pt underwent botox injections 7/15/22, 1/20/23 (at [facility]) and most recently 5/3/23\par \par h/o IBS, chronic constipation, anxiety (h/o constipation on oral pill, now w/ Ketamine infusions and Latuda), pelvic floor dysfunction, fibromyalgia, chronic fatigue, generalized weakness (followed by Pain management and Rheum as needed, on Lyrica and meloxicam), followed by physiatrist for pelvic floor dysfunction, s/p PT for over 1 year w/o improvement, nausea (on ondansetron, gas x)\par PSH umbilical hernia repair age 7\par h/o anemia s/p iron infusions last received 2/2022 \par \par Seen 12/13/22 , then at [facility] January 2023 including visits w/ Medicine, UROGYN (on chronic Keflex for UTIs), Neuro, Opthal, Chronic fatigue syndrome specialist and GI\par \par [facility] discharge paperwork reviewed:\par Pt underwent anal sphincter botox under general anesthesia on 1/20/23, c/b respiratory distress, prompting RRT and BiPAP w/ epi, steroids and continuous nebulizers, etiology thought to be bronchospasm, stat labs showed respiratory alkalosis and elevated lactate to 9.3, transferred to ICU and weaned to HFNC. Difficulty weaning to NC due to subjective respiratory distress. No hypoxia on hemodynamic assessment. ENT consulted and performed laryngoscopy which was otherwise normal\par CT neck no air trapping or tracheobronchomalacia. Pulm consulted and recommended Breo Ellipta and PRN nebulizers, followed by PFTs in outpatient setting. Multiple episodes of tachypnea when weaning to room air. Psych consulted, but pt felt her anxiety was well controlled so no new recommendations were made. \par \par [facility] recommendations reviewed:\par Advised nausea and incontinence and fissures are interconnected w/ her constipation. \par Delayed gastric emptying. Per pt which is why recommended intubation for botox procedure.\par \par Most recently s/p EUA with fissurectomy and Botox injection (5/3/23)\par Admitted postop for nausea, vomiting and inability to tolerate PO in the post-anesthesia period.\par Seen for postop visit 5/30/23\par \par Primary GI is at [place]. Established care w/ new GI at [place] earlier this month but did not like that GI and transitioned care to Dr Patel at [place] earlier this month. Pt found out he's not motility specialist and was referred w/ GI [facility]. Pt presented to office appt, but  advised cannot be seen by him and appt rescheduled with NP in the office (which pt has seen her before).  Now working with patient  and pending follow up later today, otherwise has telehealth f/u with NP July 26th.\par \par Pt reports abd pain typically improved w/ moving her bowels and would have some upper GI pressure and nausea that requires eating and drinking small quantities. Now has upper abd discomfort and shooting pains after meals especially after trying to eat bread. Now avoiding bread, but still has discomfort after eating and feels she should meet w/ motility specialist in person. \par \par Last few weeks did not see bleeding with BMs and was assuming fissure was healing, despite having burning pain w/ BMs, although two days ago had drops of blood in toilet bowl and sharp cut like pain during and after BMs. \par Has to change sitting positions due to discomfort. Pt has been using Nifedipine most days, but states she "doesn't like it," unable to explain why and opts to switch back to Diltiazem. States she prefers cream base rather than ointment and wants to try Diltiazem cream instead. She uses topical Lidocaine as needed w/ mild improvement in burning.\par She has been avoiding straining, but still sometimes has harder stools. Has been taking Calm-Magnesium and continues Motegrity and Linzess. \par \par She has been on Keflex to prevent UTIs (as per advice of [facility]), but stopped it two days ago as per advice of [facility], but pt had bladder fullness and burning w/ urination. Pt presented to urgent care, then [facility]OL refilled Keflex abx. Recommends to see ID provider for further management, has appt mid August.\par \par Of note, had iron infusion two weeks ago, has f/u with HEME in ~2-3 months.\par \par

## 2023-06-29 NOTE — ASSESSMENT
[FreeTextEntry1] : Exam notes improvement in appearance of anal fissure since most recent fissurectomy and botox injection 5/3/23\par \par H/o botox injection anal sphincter 1/20/23 at [facility], complicated by respiratory distress, RRT, ICU stay.\par Most recent fissurectomy and botox 5/3/23, complicated by postop nausea, vomiting and inability to tolerate PO, requiring inpatient observation postoperatively.\par \par She finds the diltiazem topical more helpful than nifedipine.\par \par She finds that botox injection has been helping with her anal spasms\par Can continue serial botox injection every 3 months as part of her multidisciplinary care.\par Given prior history, will need medical clearance and procedure at [facility] instead of ambulatory surgery hospital for high level of care and postoperative monitoring.\par The nature of the procedure as well as risks and benefits were reviewed with the patient. Risk/benefits/alternatives discussed at length, including but not limited to pain, bleeding, infection, swelling, recurrent or continued symptoms, need for future surgery, and risk of alteration of bowel habits, such as seepage, fecal urgency and/or fecal (gas, liquid or solid) incontinence discussed, which may be temporary or permanent.\par \par All questions were answered, patient expressed understanding, and is agreeable to this plan.\par

## 2023-06-29 NOTE — PHYSICAL EXAM
[FreeTextEntry1] : Medical assistant present for duration of physical examination\par \par General no acute distress, alert and oriented\par Psych responding appropriately to questions, in good spirits\par Nonlabored breathing\par Ambulating without assistance\par Skin no skin changes\par \par Anorectal Exam:\par Inspection posterior midline fissurectomy site superficial and smaller with epithelialization, soft small external hemorrhoids, no thrombosis\par MOHINDER nontender, no masses palpated, no blood on gloved finger\par \par Procedure: Anoscopy\par \par Pre procedure Diagnosis: h/o anal fissure and anal spasms\par Post procedure Diagnosis: h/o anal fissure and anal spasms\par Anesthesia: none\par Estimated blood loss: none\par Specimen: none\par Complications: none\par \par Consent obtained. Anoscopy was performed by passing a lighted anoscope with lubricant jelly into the anal canal and the entire anal mucosal surface was inspected. Findings included  posterior midline fissurectomy site superficial and smaller with epithelialization, mild hemorrhoids\par \par Patient tolerated examination and procedure well. \par \par

## 2023-08-09 ENCOUNTER — APPOINTMENT (OUTPATIENT)
Dept: GASTROENTEROLOGY | Facility: CLINIC | Age: 23
End: 2023-08-09
Payer: MEDICAID

## 2023-08-09 VITALS
WEIGHT: 115.5 LBS | OXYGEN SATURATION: 98 % | HEART RATE: 76 BPM | DIASTOLIC BLOOD PRESSURE: 82 MMHG | RESPIRATION RATE: 16 BRPM | BODY MASS INDEX: 20.46 KG/M2 | HEIGHT: 63 IN | SYSTOLIC BLOOD PRESSURE: 128 MMHG

## 2023-08-09 DIAGNOSIS — R63.4 ABNORMAL WEIGHT LOSS: ICD-10-CM

## 2023-08-09 DIAGNOSIS — K52.9 NONINFECTIVE GASTROENTERITIS AND COLITIS, UNSPECIFIED: ICD-10-CM

## 2023-08-09 PROCEDURE — 99204 OFFICE O/P NEW MOD 45 MIN: CPT

## 2023-08-09 NOTE — ASSESSMENT
[FreeTextEntry1] : 22 yo F  with gastroparesis (chronic nausea, early sateity, weight loss,abd pain),  recurrent anal fissures , chronic constipation.   #chronic nausea, fullness, weight loss, abdominal pain--  gastroparesis - on a liquid diet, shakes - ongoing weight loss - recommend domperidone - provided 2 providers that may prescribe and monitor pt on domperidone  #chronic constipation - normal colinic transit time, possibly from pelvic floor dysfunction.  -completed biofeedback pelvic floor PT - didnt respond  -anorectal manometry at [facility]- borderline but in summary no features to suggest an evacuation disorder.  - cont motegrity - stop lizness - start trulance - cont calm for now-- if doing, ok, can try stopping it.   #anal fissure- botox therapy w/ colorectal. diltiazem topical.   #wconcern for ?crohns given recurrent anal fissure, iron deficiency, unintentional weight loss.  - MRE - calprotectin - consider  capsule endoscopy chuy for unexplained iron deficiency , consider repeat EGD?Colonoscopy   #abnormal appearing appendix on CT scan-  re-evaluate appendix shape on MRE  f/u 6 weeks

## 2023-08-09 NOTE — HISTORY OF PRESENT ILLNESS
[FreeTextEntry1] : 24 yo F here for another opinion/transfer of care for gastroparesis and weight loss.   hx iron deficiency anemia (getting iron infusion), unclear etiology, recurrent severe anal fissures, hx constipation (some diarrhea), hx pelvic floor dysufnction, hx gastroapresis.  unintentional weight loss  Pt previously a patient with [facility] but they could not longer help her so she left the practice.   reports chronic severe nausea, 24/7 - takes emend 40 mg bid. helps but still has nausea.  at [facility] a couple months ago .for complete testing. had gastric emptying study that was Positive at 4 hours. had anorectal manometry- borderline.   reports pelvic floor dysfunction - chronic constipation , fissures,  followed by dr. Marquez - inessa botox for anal fissure. s/p fissurectomy.   For constipation she takes: calm magnesium motegrity  linzess 290 she has tried and failed amitiza both strenghts, miralax, dulcolax, linzess , lactulose.  she has not tried Trulance.  still constipation intermittent diarrhea abdominal pain weight loss  has episodes of diarrhea, but feels if she goes to a lower linzess dosage then she will be constipated.    diagnosed chronic fatigue syndrome   sees mutliple specialists   in term of her gastroparesis - Gastric emptying study w/ sb with colonic transit time.  abnl gastric etmpygin at 3 and 4 hrs normal /fast sb transit, normal colon transit.   March 3rd --- 136 lbs today , 115 lbs 20 lb weight loss  H/o botox injection anal sphincter 1/20/23 at [facility], complicated by respiratory distress, RRT, ICU stay.   Last EGD - gastritis  Last colonoscopy: 3 years ago- dec 2020- normal   Soc: no tobacco or significant EtOH  Family Hx: no significant GI family history including colon cancer, stomach cancer, IBD, celiac  ROS: constitutional: no weight loss, fevers ENT: no deafness Eyes: no blindness Neck: no lymphadenopathy Chest: no shortness of breath, no cough Cardiac: no chest pain, no palpitations Vascular: no leg swelling GI: no abdominal pain, nausea, vomiting, diarrhea, constipation, rectal bleeding, melena, dysphagia,  unless otherwise noted in HPI : no dysuria, dark urine Skin: no rashes, lesions, jaundice Heme: no bleeding Endocrine: no DM  unless otherwise stated in HPI  Physical Exam: (VS noted below) General: alert, comfortable, in no acute distress Eyes: normal sclera, anicteric Neck: normal, supple, no neck mass Pulm: no respiratory distress, clear to auscultation bilaterally  Heart: RRR, normal S1 S2 Abd: Soft, non-tender, non-distended, normal bowel sounds, no appreciable hepatosplenomegaly, no masses palpated Rectal: deferred Ext: warm and well perfused, no edema Skin: no rashes, no juandice Neuro: alert, grossly nonfocal  Labs/imaging/prior endoscopic results were reviewed to the extent available and pertinent findings noted in HPI

## 2023-08-14 LAB — CALPROTECTIN FECAL: 40 UG/G

## 2023-08-15 ENCOUNTER — TRANSCRIPTION ENCOUNTER (OUTPATIENT)
Age: 23
End: 2023-08-15

## 2023-08-15 VITALS
TEMPERATURE: 98 F | SYSTOLIC BLOOD PRESSURE: 113 MMHG | HEIGHT: 64 IN | HEART RATE: 74 BPM | OXYGEN SATURATION: 100 % | WEIGHT: 113.1 LBS | RESPIRATION RATE: 16 BRPM | DIASTOLIC BLOOD PRESSURE: 74 MMHG

## 2023-08-15 RX ORDER — MELOXICAM 15 MG/1
1 TABLET ORAL
Qty: 0 | Refills: 0 | DISCHARGE

## 2023-08-15 RX ORDER — LINACLOTIDE 145 UG/1
1 CAPSULE, GELATIN COATED ORAL
Qty: 0 | Refills: 0 | DISCHARGE

## 2023-08-15 RX ORDER — PANTOPRAZOLE SODIUM 20 MG/1
40 TABLET, DELAYED RELEASE ORAL
Refills: 0 | DISCHARGE

## 2023-08-15 RX ORDER — PANTOPRAZOLE SODIUM 20 MG/1
1 TABLET, DELAYED RELEASE ORAL
Qty: 0 | Refills: 0 | DISCHARGE

## 2023-08-15 NOTE — ASU PATIENT PROFILE, ADULT - FALL HARM RISK - HARM RISK INTERVENTIONS
Assistance with ambulation/Assistance OOB with selected safe patient handling equipment/Communicate Risk of Fall with Harm to all staff/Monitor gait and stability/Reinforce activity limits and safety measures with patient and family/Sit up slowly, dangle for a short time, stand at bedside before walking/Tailored Fall Risk Interventions/Use of alarms - bed, chair and/or voice tab/Visual Cue: Yellow wristband and red socks/Bed in lowest position, wheels locked, appropriate side rails in place/Call bell, personal items and telephone in reach/Instruct patient to call for assistance before getting out of bed or chair/Non-slip footwear when patient is out of bed/[place] to call system/Physically safe environment - no spills, clutter or unnecessary equipment/Purposeful Proactive Rounding/Room/bathroom lighting operational, light cord in reach Communicate Risk of Fall with Harm to all staff/Reinforce activity limits and safety measures with patient and family/Tailored Fall Risk Interventions/Visual Cue: Yellow wristband and red socks/Bed in lowest position, wheels locked, appropriate side rails in place/Call bell, personal items and telephone in reach/Instruct patient to call for assistance before getting out of bed or chair/Non-slip footwear when patient is out of bed/[place] to call system/Physically safe environment - no spills, clutter or unnecessary equipment/Purposeful Proactive Rounding/Room/bathroom lighting operational, light cord in reach

## 2023-08-15 NOTE — ASU PATIENT PROFILE, ADULT - NSICDXPASTSURGICALHX_GEN_ALL_CORE_FT
PAST SURGICAL HISTORY:  Elective surgery anal botox injection 1/2023 in [facility], per pt she was intubated and was in ICU for 3 days after surgery    H/O hernia repair umbilical    S/P biopsy face    S/P colonoscopy     S/P endoscopy     Status post anal fissurectomy

## 2023-08-15 NOTE — ASU PATIENT PROFILE, ADULT - NSICDXPASTMEDICALHX_GEN_ALL_CORE_FT
PAST MEDICAL HISTORY:  Acid reflux     Amplified musculoskeletal pain syndrome     JUAN positive     Anemia     Arthralgia of both knees     Asthma     Bronchospasm acute    Constipation     Delayed gastric emptying     H/O fibromyalgia     H/O headache     History of nausea     IBS (irritable bowel syndrome)     Pelvic floor dysfunction     Rosacea     Severe anxiety      PAST MEDICAL HISTORY:  Acid reflux     Amplified musculoskeletal pain syndrome     JUAN positive     Anemia     Anxiety     Arthralgia of both knees     Asthma     Bronchospasm acute    Constipation     Delayed gastric emptying     Gastroparesis     H/O fibromyalgia     H/O headache     History of chronic fatigue syndrome     History of nausea     IBS (irritable bowel syndrome)     Pelvic floor dysfunction     Rosacea     Severe anxiety

## 2023-08-16 ENCOUNTER — TRANSCRIPTION ENCOUNTER (OUTPATIENT)
Age: 23
End: 2023-08-16

## 2023-08-16 ENCOUNTER — APPOINTMENT (OUTPATIENT)
Dept: COLORECTAL SURGERY | Facility: HOSPITAL | Age: 23
End: 2023-08-16

## 2023-08-16 ENCOUNTER — OUTPATIENT (OUTPATIENT)
Dept: OUTPATIENT SERVICES | Facility: HOSPITAL | Age: 23
LOS: 1 days | Discharge: HOME | End: 2023-08-16
Payer: COMMERCIAL

## 2023-08-16 DIAGNOSIS — Z98.890 OTHER SPECIFIED POSTPROCEDURAL STATES: Chronic | ICD-10-CM

## 2023-08-16 DIAGNOSIS — Z41.9 ENCOUNTER FOR PROCEDURE FOR PURPOSES OTHER THAN REMEDYING HEALTH STATE, UNSPECIFIED: Chronic | ICD-10-CM

## 2023-08-16 PROCEDURE — 46505 CHEMODENERVATION ANAL MUSC: CPT | Mod: GC

## 2023-08-16 RX ORDER — LURASIDONE HYDROCHLORIDE 40 MG/1
20 TABLET ORAL DAILY
Refills: 0 | Status: DISCONTINUED | OUTPATIENT
Start: 2023-08-16 | End: 2023-08-17

## 2023-08-16 RX ORDER — PANTOPRAZOLE SODIUM 20 MG/1
40 TABLET, DELAYED RELEASE ORAL
Refills: 0 | Status: DISCONTINUED | OUTPATIENT
Start: 2023-08-16 | End: 2023-08-17

## 2023-08-16 RX ORDER — APREPITANT 80 MG/1
1 CAPSULE ORAL
Refills: 0 | DISCHARGE

## 2023-08-16 RX ORDER — ALBUTEROL 90 UG/1
2.5 AEROSOL, METERED ORAL ONCE
Refills: 0 | Status: DISCONTINUED | OUTPATIENT
Start: 2023-08-16 | End: 2023-08-17

## 2023-08-16 RX ORDER — SUMATRIPTAN SUCCINATE 4 MG/.5ML
0 INJECTION, SOLUTION SUBCUTANEOUS
Qty: 0 | Refills: 0 | DISCHARGE

## 2023-08-16 RX ORDER — ALBUTEROL 90 UG/1
2.5 AEROSOL, METERED ORAL
Qty: 0 | Refills: 0 | DISCHARGE

## 2023-08-16 RX ORDER — PLECANATIDE 3 MG/1
1 TABLET ORAL
Refills: 0 | DISCHARGE

## 2023-08-16 RX ORDER — KETAMINE HYDROCHLORIDE 100 MG/ML
1 INJECTION INTRAMUSCULAR; INTRAVENOUS
Qty: 0 | Refills: 0 | DISCHARGE

## 2023-08-16 RX ORDER — BUDESONIDE AND FORMOTEROL FUMARATE DIHYDRATE 160; 4.5 UG/1; UG/1
2 AEROSOL RESPIRATORY (INHALATION)
Refills: 0 | DISCHARGE

## 2023-08-16 RX ORDER — LURASIDONE HYDROCHLORIDE 40 MG/1
1 TABLET ORAL
Qty: 0 | Refills: 0 | DISCHARGE

## 2023-08-16 RX ORDER — PRUCALOPRIDE 2 MG/1
1 TABLET, FILM COATED ORAL
Qty: 0 | Refills: 0 | DISCHARGE

## 2023-08-16 RX ORDER — CLONAZEPAM 1 MG
0.5 TABLET ORAL ONCE
Refills: 0 | Status: DISCONTINUED | OUTPATIENT
Start: 2023-08-16 | End: 2023-08-16

## 2023-08-16 RX ORDER — SIMETHICONE 80 MG/1
240 TABLET, CHEWABLE ORAL
Refills: 0 | Status: DISCONTINUED | OUTPATIENT
Start: 2023-08-16 | End: 2023-08-17

## 2023-08-16 RX ORDER — CLONAZEPAM 1 MG
1 TABLET ORAL
Refills: 0 | DISCHARGE

## 2023-08-16 RX ORDER — CELECOXIB 200 MG/1
200 CAPSULE ORAL DAILY
Refills: 0 | Status: DISCONTINUED | OUTPATIENT
Start: 2023-08-16 | End: 2023-08-17

## 2023-08-16 RX ORDER — CEPHALEXIN 500 MG
250 CAPSULE ORAL
Refills: 0 | Status: DISCONTINUED | OUTPATIENT
Start: 2023-08-16 | End: 2023-08-17

## 2023-08-16 RX ORDER — ONDANSETRON 8 MG/1
4 TABLET, FILM COATED ORAL EVERY 6 HOURS
Refills: 0 | Status: DISCONTINUED | OUTPATIENT
Start: 2023-08-16 | End: 2023-08-17

## 2023-08-16 RX ORDER — APREPITANT 80 MG/1
40 CAPSULE ORAL
Refills: 0 | Status: DISCONTINUED | OUTPATIENT
Start: 2023-08-16 | End: 2023-08-17

## 2023-08-16 RX ORDER — SUMATRIPTAN SUCCINATE 4 MG/.5ML
25 INJECTION, SOLUTION SUBCUTANEOUS ONCE
Refills: 0 | Status: DISCONTINUED | OUTPATIENT
Start: 2023-08-16 | End: 2023-08-17

## 2023-08-16 RX ORDER — CELECOXIB 200 MG/1
1 CAPSULE ORAL
Qty: 0 | Refills: 0 | DISCHARGE
Start: 2023-08-16

## 2023-08-16 RX ORDER — SIMETHICONE 80 MG/1
250 TABLET, CHEWABLE ORAL
Qty: 0 | Refills: 0 | DISCHARGE

## 2023-08-16 RX ORDER — BUDESONIDE AND FORMOTEROL FUMARATE DIHYDRATE 160; 4.5 UG/1; UG/1
2 AEROSOL RESPIRATORY (INHALATION)
Refills: 0 | Status: DISCONTINUED | OUTPATIENT
Start: 2023-08-16 | End: 2023-08-17

## 2023-08-16 RX ORDER — ONABOTULINUMTOXINA 100 UNIT
100 VIAL (EA) INJECTION ONCE
Refills: 0 | Status: DISCONTINUED | OUTPATIENT
Start: 2023-08-16 | End: 2023-08-16

## 2023-08-16 RX ORDER — SODIUM CHLORIDE 9 MG/ML
1000 INJECTION, SOLUTION INTRAVENOUS
Refills: 0 | Status: DISCONTINUED | OUTPATIENT
Start: 2023-08-16 | End: 2023-08-16

## 2023-08-16 RX ORDER — PANTOPRAZOLE SODIUM 20 MG/1
1 TABLET, DELAYED RELEASE ORAL
Refills: 0 | DISCHARGE

## 2023-08-16 RX ORDER — CYPROHEPTADINE HYDROCHLORIDE 4 MG/1
4 TABLET ORAL EVERY 12 HOURS
Refills: 0 | Status: DISCONTINUED | OUTPATIENT
Start: 2023-08-16 | End: 2023-08-17

## 2023-08-16 RX ORDER — ONDANSETRON 8 MG/1
4 TABLET, FILM COATED ORAL ONCE
Refills: 0 | Status: COMPLETED | OUTPATIENT
Start: 2023-08-16 | End: 2023-08-16

## 2023-08-16 RX ADMIN — LURASIDONE HYDROCHLORIDE 20 MILLIGRAM(S): 40 TABLET ORAL at 22:54

## 2023-08-16 RX ADMIN — BUDESONIDE AND FORMOTEROL FUMARATE DIHYDRATE 2 PUFF(S): 160; 4.5 AEROSOL RESPIRATORY (INHALATION) at 22:19

## 2023-08-16 RX ADMIN — Medication 200 MILLIGRAM(S): at 22:18

## 2023-08-16 RX ADMIN — APREPITANT 40 MILLIGRAM(S): 80 CAPSULE ORAL at 22:18

## 2023-08-16 RX ADMIN — ONDANSETRON 4 MILLIGRAM(S): 8 TABLET, FILM COATED ORAL at 20:30

## 2023-08-16 RX ADMIN — PANTOPRAZOLE SODIUM 40 MILLIGRAM(S): 20 TABLET, DELAYED RELEASE ORAL at 20:31

## 2023-08-16 RX ADMIN — ONDANSETRON 4 MILLIGRAM(S): 8 TABLET, FILM COATED ORAL at 16:35

## 2023-08-16 RX ADMIN — Medication 0.5 MILLIGRAM(S): at 22:19

## 2023-08-16 NOTE — PRE-ANESTHESIA EVALUATION ADULT - NSANTHADDINFOFT_GEN_ALL_CORE
The patient is at increased risk of bronchospasm with and at risk for aspiration without a protected airway under general anesthesia. We will do light sedation with surgical local anesthesia.

## 2023-08-16 NOTE — DISCHARGE NOTE NURSING/CASE MANAGEMENT/SOCIAL WORK - PATIENT PORTAL LINK FT
You can access the FollowMyHealth Patient Portal offered by [facility] by registering at the following website: [email or web address]. By joining [employer]’s FollowMyHealth portal, you will also be able to view your health information using other applications (apps) compatible with our system.

## 2023-08-16 NOTE — PATIENT PROFILE ADULT - HEALTH LITERACY
Routing refill request to provider for review/approval because:  Patient needs to be seen because:  Due in August for physical and labs.  Last lipids were out of the standard protocol parameters    MyCHart reminder to schedule sent      
no

## 2023-08-16 NOTE — ASU DISCHARGE PLAN (ADULT/PEDIATRIC) - CARE PROVIDER_API CALL
Marquita Marquez  Surgery  [facility]  [place] [address]  Phone: [phone]  Fax: [phone]  Follow Up Time:

## 2023-08-16 NOTE — BRIEF OPERATIVE NOTE - OPERATION/FINDINGS
Once patient adequately sedately placed in prone position with adequate padding. Local anesthesia given. Hill [place] retractor placed. Previous site of fissurectomy with good healing noted. No new pathology visualized. 100 Units of botox diluted with 1cc normal saline. This was split and injected into the sphincter complex in all 4 quadrants. Hemostasis confirmed at end. Once patient adequately sedately placed in prone position with adequate padding. Local anesthesia given. Hill Root retractor placed. Previous site of fissurectomy with epithelialization noted. No new pathology visualized. 100 Units of botox diluted with 1cc normal saline. This was split and injected into the sphincter complex in all 4 quadrants. Hemostasis confirmed at end.

## 2023-08-16 NOTE — PATIENT PROFILE ADULT - NSTRANSFERBELONGINGSRESP_GEN_A_NUR
What Type Of Note Output Would You Prefer (Optional)?: Bullet Format Is The Patient Presenting As Previously Scheduled?: Yes How Severe Is Your Rash?: mild Is This A New Presentation, Or A Follow-Up?: Rash no

## 2023-08-16 NOTE — ASU DISCHARGE PLAN (ADULT/PEDIATRIC) - ASU DC SPECIAL INSTRUCTIONSFT
-dry gauze in place, change dressing as needed.  -do Sitz bath three times a day and with every bowel movement  -Continue taking all routine home medications as instructed Follow up with Dr. Marquez in 1 week or as directed. Call the office at [phone] to schedule your appointment. You may resume regular diet. You should be urinating at least 3-4x per day. Call the office if you experience increasing abdominal pain, nausea, vomiting, or temperature >101 F.    -dry gauze in place, change dressing as needed.  -do Sitz bath three times a day and with every bowel movement  -Continue taking all routine home medications as instructed

## 2023-08-16 NOTE — ASU DISCHARGE PLAN (ADULT/PEDIATRIC) - NS MD DC FALL RISK RISK
For information on Fall & Injury Prevention, visit: [email or web address] OR  [email or web address] OR  https://www.cdc.gov/steadi/patient.html

## 2023-08-16 NOTE — PATIENT PROFILE ADULT - FALL HARM RISK - HARM RISK INTERVENTIONS

## 2023-08-17 VITALS
DIASTOLIC BLOOD PRESSURE: 66 MMHG | RESPIRATION RATE: 19 BRPM | TEMPERATURE: 98 F | SYSTOLIC BLOOD PRESSURE: 106 MMHG | OXYGEN SATURATION: 99 % | HEART RATE: 76 BPM

## 2023-08-17 PROBLEM — K31.84 GASTROPARESIS: Chronic | Status: ACTIVE | Noted: 2023-08-15

## 2023-08-17 PROCEDURE — 94640 AIRWAY INHALATION TREATMENT: CPT

## 2023-08-17 PROCEDURE — 46505 CHEMODENERVATION ANAL MUSC: CPT

## 2023-08-17 RX ORDER — ONDANSETRON 8 MG/1
4 TABLET, FILM COATED ORAL EVERY 6 HOURS
Refills: 0 | Status: DISCONTINUED | OUTPATIENT
Start: 2023-08-17 | End: 2023-08-17

## 2023-08-17 RX ORDER — ONDANSETRON 8 MG/1
4 TABLET, FILM COATED ORAL ONCE
Refills: 0 | Status: COMPLETED | OUTPATIENT
Start: 2023-08-17 | End: 2023-08-17

## 2023-08-17 RX ADMIN — ONDANSETRON 4 MILLIGRAM(S): 8 TABLET, FILM COATED ORAL at 10:07

## 2023-08-17 RX ADMIN — Medication 200 MILLIGRAM(S): at 06:35

## 2023-08-17 RX ADMIN — PANTOPRAZOLE SODIUM 40 MILLIGRAM(S): 20 TABLET, DELAYED RELEASE ORAL at 06:57

## 2023-08-17 RX ADMIN — ONDANSETRON 4 MILLIGRAM(S): 8 TABLET, FILM COATED ORAL at 08:47

## 2023-08-17 RX ADMIN — BUDESONIDE AND FORMOTEROL FUMARATE DIHYDRATE 2 PUFF(S): 160; 4.5 AEROSOL RESPIRATORY (INHALATION) at 07:18

## 2023-08-17 RX ADMIN — CELECOXIB 200 MILLIGRAM(S): 200 CAPSULE ORAL at 06:57

## 2023-08-17 RX ADMIN — CYPROHEPTADINE HYDROCHLORIDE 4 MILLIGRAM(S): 4 TABLET ORAL at 06:38

## 2023-08-17 RX ADMIN — CELECOXIB 200 MILLIGRAM(S): 200 CAPSULE ORAL at 07:57

## 2023-08-17 RX ADMIN — APREPITANT 40 MILLIGRAM(S): 80 CAPSULE ORAL at 06:57

## 2023-08-17 RX ADMIN — Medication 250 MILLIGRAM(S): at 06:41

## 2023-08-17 NOTE — PROGRESS NOTE ADULT - SUBJECTIVE AND OBJECTIVE BOX
Patient evaluated with chief resident during AM rounds    STATUS POST:  Injection of Botox into anus    POST OPERATIVE DAY #: 1    Overnight Events: +Emesis reported by patient, not seen.   SUBJECTIVE: Patient states that she feels otherwise well. Pain well controlled. Episodes of emesis overnight not reported to MD team, otherwise feeling well. Ambulating without issue. Self catheterized once. +N/+V/+F/-BM. Ambulating without issue.     Denies Chest Pain, Difficulty breathing, Calf Pain, Headache, Dizziness    OBJECTIVE:  Vital Signs Last 24 Hrs  T(C): 37.1 (16 Aug 2023 20:16), Max: 37.4 (16 Aug 2023 14:56)  T(F): 98.8 (16 Aug 2023 20:16), Max: 99.4 (16 Aug 2023 14:56)  HR: 73 (16 Aug 2023 20:16) (71 - 109)  BP: 101/62 (16 Aug 2023 20:16) (100/49 - 126/63)  BP(mean): 84 (16 Aug 2023 18:12) (74 - 88)  RR: 18 (16 Aug 2023 20:16) (16 - 31)  SpO2: 95% (16 Aug 2023 20:16) (95% - 100%)    Parameters below as of 16 Aug 2023 20:16  Patient On (Oxygen Delivery Method): room air        I&O's Summary    16 Aug 2023 07:01  -  17 Aug 2023 05:26  --------------------------------------------------------  IN: 0 mL / OUT: 201 mL / NET: -201 mL        Physical Exam:  General Appearance: Appears well, NAD  Pulmonary: Nonlabored breathing, no respiratory distress  Cardiovascular: NSR  Abdomen: Soft, nondistneded, mild tenderness to palpation reported  Extremities: WWP, SCD's in place     LABS:

## 2023-08-17 NOTE — PROGRESS NOTE ADULT - ASSESSMENT
23yr old female hx of anal spasm now s/p EUA with botox injection, uncomplicated who is admitted for 23 hour OBS. Emesis reported by patient overnight. Otherwise well and stable for discharge    Reg diet/no fluids  No DVT PPX  Home Meds  Dispo: Home in AM

## 2023-09-07 RX ORDER — SENNOSIDES 8.6 MG TABLETS 8.6 MG/1
8.6 TABLET ORAL
Qty: 60 | Refills: 5 | Status: ACTIVE | COMMUNITY
Start: 2023-09-07 | End: 1900-01-01

## 2023-09-07 RX ORDER — LIDOCAINE 50 MG/G
5 CREAM TOPICAL
Qty: 1 | Refills: 1 | Status: ACTIVE | COMMUNITY
Start: 2023-09-07 | End: 1900-01-01

## 2023-09-14 ENCOUNTER — APPOINTMENT (OUTPATIENT)
Dept: COLORECTAL SURGERY | Facility: CLINIC | Age: 23
End: 2023-09-14
Payer: MEDICAID

## 2023-09-14 VITALS
DIASTOLIC BLOOD PRESSURE: 84 MMHG | BODY MASS INDEX: 20.2 KG/M2 | HEART RATE: 84 BPM | WEIGHT: 114 LBS | HEIGHT: 63 IN | TEMPERATURE: 98.3 F | SYSTOLIC BLOOD PRESSURE: 124 MMHG

## 2023-09-14 PROBLEM — F41.9 ANXIETY DISORDER, UNSPECIFIED: Chronic | Status: ACTIVE | Noted: 2023-08-15

## 2023-09-14 PROBLEM — Z86.69 PERSONAL HISTORY OF OTHER DISEASES OF THE NERVOUS SYSTEM AND SENSE ORGANS: Chronic | Status: ACTIVE | Noted: 2023-08-15

## 2023-09-14 PROCEDURE — 99213 OFFICE O/P EST LOW 20 MIN: CPT

## 2023-09-14 RX ORDER — POLYETHYLENE GLYCOL 3350 17 G/17G
17 POWDER, FOR SOLUTION ORAL DAILY
Qty: 1 | Refills: 5 | Status: ACTIVE | COMMUNITY
Start: 2023-09-14 | End: 1900-01-01

## 2023-09-18 ENCOUNTER — APPOINTMENT (OUTPATIENT)
Dept: GASTROENTEROLOGY | Facility: CLINIC | Age: 23
End: 2023-09-18
Payer: MEDICAID

## 2023-09-18 VITALS
DIASTOLIC BLOOD PRESSURE: 79 MMHG | HEART RATE: 73 BPM | SYSTOLIC BLOOD PRESSURE: 117 MMHG | RESPIRATION RATE: 16 BRPM | OXYGEN SATURATION: 100 %

## 2023-09-18 DIAGNOSIS — K60.2 ANAL FISSURE, UNSPECIFIED: ICD-10-CM

## 2023-09-18 PROCEDURE — 99214 OFFICE O/P EST MOD 30 MIN: CPT

## 2023-09-18 RX ORDER — PREGABALIN 200 MG/1
200 CAPSULE ORAL
Refills: 0 | Status: ACTIVE | COMMUNITY

## 2023-09-18 RX ORDER — SUMATRIPTAN 25 MG/1
25 TABLET, FILM COATED ORAL
Refills: 0 | Status: ACTIVE | COMMUNITY

## 2023-09-18 RX ORDER — TRIAMCINOLONE ACETONIDE 40 MG/ML
40 SUSPENSION INTRA-ARTERIAL; INTRAMUSCULAR
Refills: 0 | Status: ACTIVE | COMMUNITY

## 2023-09-18 RX ORDER — AZELAIC ACID 0.15 G/G
15 GEL TOPICAL
Refills: 0 | Status: ACTIVE | COMMUNITY

## 2023-09-18 RX ORDER — GENTAMICIN SULFATE 40 MG/ML
40 INJECTION, SOLUTION INTRAMUSCULAR; INTRAVENOUS
Refills: 0 | Status: ACTIVE | COMMUNITY

## 2023-09-18 RX ORDER — BUDESONIDE AND FORMOTEROL FUMARATE DIHYDRATE 80; 4.5 UG/1; UG/1
80-4.5 AEROSOL RESPIRATORY (INHALATION)
Refills: 0 | Status: ACTIVE | COMMUNITY

## 2023-09-18 RX ORDER — SIMETHICONE 180 MG
180 CAPSULE ORAL
Refills: 0 | Status: ACTIVE | COMMUNITY

## 2023-09-18 RX ORDER — MELOXICAM 15 MG/1
15 TABLET ORAL
Refills: 0 | Status: ACTIVE | COMMUNITY

## 2023-09-18 RX ORDER — CEPHALEXIN 250 MG/5ML
SUSPENSION, RECONSTITUTED, ORAL (ML) ORAL
Refills: 0 | Status: ACTIVE | COMMUNITY

## 2023-09-18 RX ORDER — LURASIDONE HYDROCHLORIDE 20 MG/1
20 TABLET, FILM COATED ORAL
Refills: 0 | Status: ACTIVE | COMMUNITY

## 2023-09-18 RX ORDER — PANTOPRAZOLE 40 MG/1
40 TABLET, DELAYED RELEASE ORAL
Qty: 60 | Refills: 5 | Status: ACTIVE | COMMUNITY
Start: 1900-01-01 | End: 1900-01-01

## 2023-09-18 RX ORDER — ALBUTEROL SULFATE 2.5 MG/3ML
(2.5 MG/3ML) SOLUTION RESPIRATORY (INHALATION)
Refills: 0 | Status: ACTIVE | COMMUNITY

## 2023-09-18 RX ORDER — KETAMINE HCL IN 0.9 % NACL 200 MG/200
PLASTIC BAG, INJECTION (ML) INTRAVENOUS
Refills: 0 | Status: ACTIVE | COMMUNITY

## 2023-09-18 RX ORDER — LINACLOTIDE 290 UG/1
290 CAPSULE, GELATIN COATED ORAL
Refills: 0 | Status: ACTIVE | COMMUNITY

## 2023-09-19 PROBLEM — K60.2 ANAL FISSURE: Status: ACTIVE | Noted: 2022-02-22

## 2023-09-19 RX ORDER — ONDANSETRON 8 MG/1
8 TABLET, ORALLY DISINTEGRATING ORAL
Qty: 30 | Refills: 1 | Status: ACTIVE | COMMUNITY
Start: 2023-09-19 | End: 1900-01-01

## 2023-10-12 ENCOUNTER — APPOINTMENT (OUTPATIENT)
Dept: COLORECTAL SURGERY | Facility: CLINIC | Age: 23
End: 2023-10-12
Payer: MEDICAID

## 2023-10-12 VITALS
TEMPERATURE: 98.1 F | HEART RATE: 67 BPM | DIASTOLIC BLOOD PRESSURE: 75 MMHG | SYSTOLIC BLOOD PRESSURE: 112 MMHG | BODY MASS INDEX: 20.2 KG/M2 | WEIGHT: 114 LBS | HEIGHT: 63 IN

## 2023-10-12 DIAGNOSIS — K59.4 ANAL SPASM: ICD-10-CM

## 2023-10-12 PROCEDURE — 46600 DIAGNOSTIC ANOSCOPY SPX: CPT

## 2023-10-31 ENCOUNTER — APPOINTMENT (OUTPATIENT)
Dept: GASTROENTEROLOGY | Facility: CLINIC | Age: 23
End: 2023-10-31
Payer: MEDICAID

## 2023-10-31 VITALS
BODY MASS INDEX: 20.46 KG/M2 | HEART RATE: 74 BPM | TEMPERATURE: 98.2 F | RESPIRATION RATE: 16 BRPM | HEIGHT: 63 IN | OXYGEN SATURATION: 98 % | WEIGHT: 115.5 LBS | SYSTOLIC BLOOD PRESSURE: 116 MMHG | DIASTOLIC BLOOD PRESSURE: 68 MMHG

## 2023-10-31 DIAGNOSIS — R10.84 GENERALIZED ABDOMINAL PAIN: ICD-10-CM

## 2023-10-31 DIAGNOSIS — K59.09 OTHER CONSTIPATION: ICD-10-CM

## 2023-10-31 DIAGNOSIS — K31.84 GASTROPARESIS: ICD-10-CM

## 2023-10-31 PROCEDURE — 99214 OFFICE O/P EST MOD 30 MIN: CPT

## 2023-10-31 RX ORDER — PRUCALOPRIDE 2 MG/1
2 TABLET, FILM COATED ORAL
Qty: 90 | Refills: 2 | Status: ACTIVE | COMMUNITY
Start: 2023-10-31 | End: 1900-01-01

## 2023-10-31 RX ORDER — PRUCALOPRIDE 2 MG/1
2 TABLET, FILM COATED ORAL
Qty: 90 | Refills: 2 | Status: ACTIVE | COMMUNITY

## 2023-10-31 RX ORDER — LINACLOTIDE 290 UG/1
290 CAPSULE, GELATIN COATED ORAL
Qty: 90 | Refills: 2 | Status: ACTIVE | COMMUNITY
Start: 2023-09-14 | End: 1900-01-01

## 2023-11-01 ENCOUNTER — NON-APPOINTMENT (OUTPATIENT)
Age: 23
End: 2023-11-01

## 2023-11-01 LAB
ALBUMIN SERPL ELPH-MCNC: 4.9 G/DL
ALP BLD-CCNC: 52 U/L
ALT SERPL-CCNC: 9 U/L
ANION GAP SERPL CALC-SCNC: 14 MMOL/L
AST SERPL-CCNC: 11 U/L
BASOPHILS # BLD AUTO: 0.03 K/UL
BASOPHILS NFR BLD AUTO: 0.7 %
BILIRUB SERPL-MCNC: 0.2 MG/DL
BUN SERPL-MCNC: 14 MG/DL
CALCIUM SERPL-MCNC: 9.8 MG/DL
CHLORIDE SERPL-SCNC: 104 MMOL/L
CO2 SERPL-SCNC: 23 MMOL/L
CREAT SERPL-MCNC: 0.63 MG/DL
EGFR: 127 ML/MIN/1.73M2
EOSINOPHIL # BLD AUTO: 0.03 K/UL
EOSINOPHIL NFR BLD AUTO: 0.7 %
GLUCOSE SERPL-MCNC: 98 MG/DL
HCG SERPL-MCNC: <1 MIU/ML
HCT VFR BLD CALC: 46 %
HGB BLD-MCNC: 14.6 G/DL
IMM GRANULOCYTES NFR BLD AUTO: 0 %
LYMPHOCYTES # BLD AUTO: 1 K/UL
LYMPHOCYTES NFR BLD AUTO: 22.6 %
MAGNESIUM SERPL-MCNC: 2.1 MG/DL
MAN DIFF?: NORMAL
MCHC RBC-ENTMCNC: 31.1 PG
MCHC RBC-ENTMCNC: 31.7 GM/DL
MCV RBC AUTO: 98.1 FL
MONOCYTES # BLD AUTO: 0.39 K/UL
MONOCYTES NFR BLD AUTO: 8.8 %
NEUTROPHILS # BLD AUTO: 2.98 K/UL
NEUTROPHILS NFR BLD AUTO: 67.2 %
PLATELET # BLD AUTO: 215 K/UL
POTASSIUM SERPL-SCNC: 4.1 MMOL/L
PROT SERPL-MCNC: 7.4 G/DL
RBC # BLD: 4.69 M/UL
RBC # FLD: 12.4 %
SODIUM SERPL-SCNC: 141 MMOL/L
T4 FREE SERPL-MCNC: 1 NG/DL
TSH SERPL-ACNC: 0.76 UIU/ML
WBC # FLD AUTO: 4.43 K/UL

## 2023-11-09 ENCOUNTER — EMERGENCY (EMERGENCY)
Facility: HOSPITAL | Age: 23
LOS: 1 days | Discharge: HOME | End: 2023-11-09
Attending: STUDENT IN AN ORGANIZED HEALTH CARE EDUCATION/TRAINING PROGRAM | Admitting: STUDENT IN AN ORGANIZED HEALTH CARE EDUCATION/TRAINING PROGRAM
Payer: COMMERCIAL

## 2023-11-09 VITALS
HEART RATE: 90 BPM | OXYGEN SATURATION: 97 % | HEIGHT: 63 IN | DIASTOLIC BLOOD PRESSURE: 86 MMHG | TEMPERATURE: 98 F | RESPIRATION RATE: 20 BRPM | SYSTOLIC BLOOD PRESSURE: 133 MMHG | WEIGHT: 160.06 LBS

## 2023-11-09 DIAGNOSIS — R31.9 HEMATURIA, UNSPECIFIED: ICD-10-CM

## 2023-11-09 DIAGNOSIS — Z98.890 OTHER SPECIFIED POSTPROCEDURAL STATES: Chronic | ICD-10-CM

## 2023-11-09 DIAGNOSIS — R42 DIZZINESS AND GIDDINESS: ICD-10-CM

## 2023-11-09 DIAGNOSIS — N39.0 URINARY TRACT INFECTION, SITE NOT SPECIFIED: ICD-10-CM

## 2023-11-09 DIAGNOSIS — Z41.9 ENCOUNTER FOR PROCEDURE FOR PURPOSES OTHER THAN REMEDYING HEALTH STATE, UNSPECIFIED: Chronic | ICD-10-CM

## 2023-11-09 LAB
ANION GAP SERPL CALC-SCNC: 12 MMOL/L — SIGNIFICANT CHANGE UP (ref 5–17)
ANION GAP SERPL CALC-SCNC: 12 MMOL/L — SIGNIFICANT CHANGE UP (ref 5–17)
APPEARANCE UR: ABNORMAL
APPEARANCE UR: ABNORMAL
APTT BLD: 33.3 SEC — SIGNIFICANT CHANGE UP (ref 24.5–35.6)
APTT BLD: 33.3 SEC — SIGNIFICANT CHANGE UP (ref 24.5–35.6)
BACTERIA # UR AUTO: NEGATIVE /HPF — SIGNIFICANT CHANGE UP
BACTERIA # UR AUTO: NEGATIVE /HPF — SIGNIFICANT CHANGE UP
BASOPHILS # BLD AUTO: 0.03 K/UL — SIGNIFICANT CHANGE UP (ref 0–0.2)
BASOPHILS # BLD AUTO: 0.03 K/UL — SIGNIFICANT CHANGE UP (ref 0–0.2)
BASOPHILS NFR BLD AUTO: 0.4 % — SIGNIFICANT CHANGE UP (ref 0–2)
BASOPHILS NFR BLD AUTO: 0.4 % — SIGNIFICANT CHANGE UP (ref 0–2)
BILIRUB UR-MCNC: NEGATIVE — SIGNIFICANT CHANGE UP
BILIRUB UR-MCNC: NEGATIVE — SIGNIFICANT CHANGE UP
BUN SERPL-MCNC: 11 MG/DL — SIGNIFICANT CHANGE UP (ref 7–23)
BUN SERPL-MCNC: 11 MG/DL — SIGNIFICANT CHANGE UP (ref 7–23)
CALCIUM SERPL-MCNC: 9.9 MG/DL — SIGNIFICANT CHANGE UP (ref 8.4–10.5)
CALCIUM SERPL-MCNC: 9.9 MG/DL — SIGNIFICANT CHANGE UP (ref 8.4–10.5)
CAST: 0 /LPF — SIGNIFICANT CHANGE UP (ref 0–4)
CAST: 0 /LPF — SIGNIFICANT CHANGE UP (ref 0–4)
CHLORIDE SERPL-SCNC: 103 MMOL/L — SIGNIFICANT CHANGE UP (ref 96–108)
CHLORIDE SERPL-SCNC: 103 MMOL/L — SIGNIFICANT CHANGE UP (ref 96–108)
CK SERPL-CCNC: 135 U/L — SIGNIFICANT CHANGE UP (ref 25–170)
CK SERPL-CCNC: 135 U/L — SIGNIFICANT CHANGE UP (ref 25–170)
CO2 SERPL-SCNC: 26 MMOL/L — SIGNIFICANT CHANGE UP (ref 22–31)
CO2 SERPL-SCNC: 26 MMOL/L — SIGNIFICANT CHANGE UP (ref 22–31)
COLOR SPEC: ABNORMAL
COLOR SPEC: ABNORMAL
CREAT SERPL-MCNC: 0.68 MG/DL — SIGNIFICANT CHANGE UP (ref 0.5–1.3)
CREAT SERPL-MCNC: 0.68 MG/DL — SIGNIFICANT CHANGE UP (ref 0.5–1.3)
DIFF PNL FLD: ABNORMAL
DIFF PNL FLD: ABNORMAL
EGFR: 125 ML/MIN/1.73M2 — SIGNIFICANT CHANGE UP
EGFR: 125 ML/MIN/1.73M2 — SIGNIFICANT CHANGE UP
EOSINOPHIL # BLD AUTO: 0.01 K/UL — SIGNIFICANT CHANGE UP (ref 0–0.5)
EOSINOPHIL # BLD AUTO: 0.01 K/UL — SIGNIFICANT CHANGE UP (ref 0–0.5)
EOSINOPHIL NFR BLD AUTO: 0.1 % — SIGNIFICANT CHANGE UP (ref 0–6)
EOSINOPHIL NFR BLD AUTO: 0.1 % — SIGNIFICANT CHANGE UP (ref 0–6)
GLUCOSE SERPL-MCNC: 92 MG/DL — SIGNIFICANT CHANGE UP (ref 70–99)
GLUCOSE SERPL-MCNC: 92 MG/DL — SIGNIFICANT CHANGE UP (ref 70–99)
GLUCOSE UR QL: NEGATIVE MG/DL — SIGNIFICANT CHANGE UP
GLUCOSE UR QL: NEGATIVE MG/DL — SIGNIFICANT CHANGE UP
HCG SERPL-ACNC: <0 MIU/ML — SIGNIFICANT CHANGE UP
HCG SERPL-ACNC: <0 MIU/ML — SIGNIFICANT CHANGE UP
HCT VFR BLD CALC: 41.8 % — SIGNIFICANT CHANGE UP (ref 34.5–45)
HCT VFR BLD CALC: 41.8 % — SIGNIFICANT CHANGE UP (ref 34.5–45)
HGB BLD-MCNC: 14.2 G/DL — SIGNIFICANT CHANGE UP (ref 11.5–15.5)
HGB BLD-MCNC: 14.2 G/DL — SIGNIFICANT CHANGE UP (ref 11.5–15.5)
IMM GRANULOCYTES NFR BLD AUTO: 0.1 % — SIGNIFICANT CHANGE UP (ref 0–0.9)
IMM GRANULOCYTES NFR BLD AUTO: 0.1 % — SIGNIFICANT CHANGE UP (ref 0–0.9)
INR BLD: 0.99 — SIGNIFICANT CHANGE UP (ref 0.85–1.18)
INR BLD: 0.99 — SIGNIFICANT CHANGE UP (ref 0.85–1.18)
KETONES UR-MCNC: 40 MG/DL
KETONES UR-MCNC: 40 MG/DL
LEUKOCYTE ESTERASE UR-ACNC: ABNORMAL
LEUKOCYTE ESTERASE UR-ACNC: ABNORMAL
LYMPHOCYTES # BLD AUTO: 1.35 K/UL — SIGNIFICANT CHANGE UP (ref 1–3.3)
LYMPHOCYTES # BLD AUTO: 1.35 K/UL — SIGNIFICANT CHANGE UP (ref 1–3.3)
LYMPHOCYTES # BLD AUTO: 19.5 % — SIGNIFICANT CHANGE UP (ref 13–44)
LYMPHOCYTES # BLD AUTO: 19.5 % — SIGNIFICANT CHANGE UP (ref 13–44)
MCHC RBC-ENTMCNC: 31 PG — SIGNIFICANT CHANGE UP (ref 27–34)
MCHC RBC-ENTMCNC: 31 PG — SIGNIFICANT CHANGE UP (ref 27–34)
MCHC RBC-ENTMCNC: 34 GM/DL — SIGNIFICANT CHANGE UP (ref 32–36)
MCHC RBC-ENTMCNC: 34 GM/DL — SIGNIFICANT CHANGE UP (ref 32–36)
MCV RBC AUTO: 91.3 FL — SIGNIFICANT CHANGE UP (ref 80–100)
MCV RBC AUTO: 91.3 FL — SIGNIFICANT CHANGE UP (ref 80–100)
MONOCYTES # BLD AUTO: 0.49 K/UL — SIGNIFICANT CHANGE UP (ref 0–0.9)
MONOCYTES # BLD AUTO: 0.49 K/UL — SIGNIFICANT CHANGE UP (ref 0–0.9)
MONOCYTES NFR BLD AUTO: 7.1 % — SIGNIFICANT CHANGE UP (ref 2–14)
MONOCYTES NFR BLD AUTO: 7.1 % — SIGNIFICANT CHANGE UP (ref 2–14)
MUCOUS THREADS # UR AUTO: PRESENT
MUCOUS THREADS # UR AUTO: PRESENT
NEUTROPHILS # BLD AUTO: 5.05 K/UL — SIGNIFICANT CHANGE UP (ref 1.8–7.4)
NEUTROPHILS # BLD AUTO: 5.05 K/UL — SIGNIFICANT CHANGE UP (ref 1.8–7.4)
NEUTROPHILS NFR BLD AUTO: 72.8 % — SIGNIFICANT CHANGE UP (ref 43–77)
NEUTROPHILS NFR BLD AUTO: 72.8 % — SIGNIFICANT CHANGE UP (ref 43–77)
NITRITE UR-MCNC: NEGATIVE — SIGNIFICANT CHANGE UP
NITRITE UR-MCNC: NEGATIVE — SIGNIFICANT CHANGE UP
NRBC # BLD: 0 /100 WBCS — SIGNIFICANT CHANGE UP (ref 0–0)
NRBC # BLD: 0 /100 WBCS — SIGNIFICANT CHANGE UP (ref 0–0)
PH UR: 7.5 — SIGNIFICANT CHANGE UP (ref 5–8)
PH UR: 7.5 — SIGNIFICANT CHANGE UP (ref 5–8)
PLATELET # BLD AUTO: 211 K/UL — SIGNIFICANT CHANGE UP (ref 150–400)
PLATELET # BLD AUTO: 211 K/UL — SIGNIFICANT CHANGE UP (ref 150–400)
POTASSIUM SERPL-MCNC: 3.6 MMOL/L — SIGNIFICANT CHANGE UP (ref 3.5–5.3)
POTASSIUM SERPL-MCNC: 3.6 MMOL/L — SIGNIFICANT CHANGE UP (ref 3.5–5.3)
POTASSIUM SERPL-SCNC: 3.6 MMOL/L — SIGNIFICANT CHANGE UP (ref 3.5–5.3)
POTASSIUM SERPL-SCNC: 3.6 MMOL/L — SIGNIFICANT CHANGE UP (ref 3.5–5.3)
PROT UR-MCNC: 30 MG/DL
PROT UR-MCNC: 30 MG/DL
PROTHROM AB SERPL-ACNC: 11.3 SEC — SIGNIFICANT CHANGE UP (ref 9.5–13)
PROTHROM AB SERPL-ACNC: 11.3 SEC — SIGNIFICANT CHANGE UP (ref 9.5–13)
RBC # BLD: 4.58 M/UL — SIGNIFICANT CHANGE UP (ref 3.8–5.2)
RBC # BLD: 4.58 M/UL — SIGNIFICANT CHANGE UP (ref 3.8–5.2)
RBC # FLD: 12.1 % — SIGNIFICANT CHANGE UP (ref 10.3–14.5)
RBC # FLD: 12.1 % — SIGNIFICANT CHANGE UP (ref 10.3–14.5)
RBC CASTS # UR COMP ASSIST: >1900 /HPF — HIGH (ref 0–4)
RBC CASTS # UR COMP ASSIST: >1900 /HPF — HIGH (ref 0–4)
SODIUM SERPL-SCNC: 141 MMOL/L — SIGNIFICANT CHANGE UP (ref 135–145)
SODIUM SERPL-SCNC: 141 MMOL/L — SIGNIFICANT CHANGE UP (ref 135–145)
SP GR SPEC: 1.01 — SIGNIFICANT CHANGE UP (ref 1–1.03)
SP GR SPEC: 1.01 — SIGNIFICANT CHANGE UP (ref 1–1.03)
SQUAMOUS # UR AUTO: 1 /HPF — SIGNIFICANT CHANGE UP (ref 0–5)
SQUAMOUS # UR AUTO: 1 /HPF — SIGNIFICANT CHANGE UP (ref 0–5)
UROBILINOGEN FLD QL: 1 MG/DL — SIGNIFICANT CHANGE UP (ref 0.2–1)
UROBILINOGEN FLD QL: 1 MG/DL — SIGNIFICANT CHANGE UP (ref 0.2–1)
WBC # BLD: 6.94 K/UL — SIGNIFICANT CHANGE UP (ref 3.8–10.5)
WBC # BLD: 6.94 K/UL — SIGNIFICANT CHANGE UP (ref 3.8–10.5)
WBC # FLD AUTO: 6.94 K/UL — SIGNIFICANT CHANGE UP (ref 3.8–10.5)
WBC # FLD AUTO: 6.94 K/UL — SIGNIFICANT CHANGE UP (ref 3.8–10.5)
WBC UR QL: 54 /HPF — HIGH (ref 0–5)
WBC UR QL: 54 /HPF — HIGH (ref 0–5)

## 2023-11-09 PROCEDURE — 74178 CT ABD&PLV WO CNTR FLWD CNTR: CPT | Mod: 26,MG

## 2023-11-09 PROCEDURE — G1004: CPT

## 2023-11-09 PROCEDURE — 99285 EMERGENCY DEPT VISIT HI MDM: CPT

## 2023-11-09 RX ORDER — ACETAMINOPHEN 500 MG
1000 TABLET ORAL ONCE
Refills: 0 | Status: COMPLETED | OUTPATIENT
Start: 2023-11-09 | End: 2023-11-09

## 2023-11-09 RX ORDER — SODIUM CHLORIDE 9 MG/ML
1000 INJECTION INTRAMUSCULAR; INTRAVENOUS; SUBCUTANEOUS ONCE
Refills: 0 | Status: COMPLETED | OUTPATIENT
Start: 2023-11-09 | End: 2023-11-09

## 2023-11-09 RX ORDER — ONDANSETRON 8 MG/1
4 TABLET, FILM COATED ORAL ONCE
Refills: 0 | Status: COMPLETED | OUTPATIENT
Start: 2023-11-09 | End: 2023-11-09

## 2023-11-09 RX ADMIN — SODIUM CHLORIDE 1000 MILLILITER(S): 9 INJECTION INTRAMUSCULAR; INTRAVENOUS; SUBCUTANEOUS at 20:25

## 2023-11-09 RX ADMIN — Medication 400 MILLIGRAM(S): at 20:40

## 2023-11-09 RX ADMIN — ONDANSETRON 4 MILLIGRAM(S): 8 TABLET, FILM COATED ORAL at 20:39

## 2023-11-09 NOTE — ED ADULT NURSE NOTE - NSICDXPASTMEDICALHX_GEN_ALL_CORE_FT
PAST MEDICAL HISTORY:  Acid reflux     Amplified musculoskeletal pain syndrome     JUAN positive     Anemia     Anxiety     Arthralgia of both knees     Asthma     Bronchospasm acute    Constipation     Delayed gastric emptying     Gastroparesis     H/O fibromyalgia     H/O headache     History of chronic fatigue syndrome     History of nausea     IBS (irritable bowel syndrome)     Pelvic floor dysfunction     Rosacea     Severe anxiety

## 2023-11-09 NOTE — ED ADULT TRIAGE NOTE - CHIEF COMPLAINT QUOTE
pt c/o hematuria x 2 days. rpts hx of "bladder pain syndrome and urinary retention." pt states, " I'm seeing red blood when I straight cath myself." +lightheadedness.

## 2023-11-09 NOTE — ED ADULT NURSE NOTE - OBJECTIVE STATEMENT
Pt presents to ED c/o hematuria since yesterday. had episode last week that resolved, started again yesterday. self catheterizes r/t pelvic floor dysfunction. reports bright red blood every time self caths. +lightheadedness. denies fevers, chills, cp, sob. Pt A&Ox4, ambulatory with steady gait, speaking in clear/full sentences, no acute distress, vital signs stable.

## 2023-11-09 NOTE — ED ADULT NURSE REASSESSMENT NOTE - NS ED NURSE REASSESS COMMENT FT1
Report received from dayshift RN. Pt is lying comfortably on stretcher, NAD noted. Breathing spont on RA, unlabored. Pt endorses some nausea and pain. VIOLETA Ferrell notified.

## 2023-11-09 NOTE — CONSULT NOTE ADULT - SUBJECTIVE AND OBJECTIVE BOX
HPI: 24 yo female with pelvic floor dysfunction, urinary retention (straight cath's about 3x/day), chronic UTI's (on po keflex daily), and  chronic pain syndrome, presents to ED c/o hematuria x 1 week now. Hematuria improved after couple days and then recurred. +mid to lower abdominal discomfort. Felt light headed today. No fever/chills. No dysuria. No N/V.      PAST MEDICAL & SURGICAL HISTORY:  Severe anxiety      H/O headache      Asthma      Acid reflux      H/O fibromyalgia      Anemia      Constipation      History of nausea      IBS (irritable bowel syndrome)      Rosacea      Amplified musculoskeletal pain syndrome      Arthralgia of both knees      JUAN positive      Bronchospasm  acute      Delayed gastric emptying      Pelvic floor dysfunction      Gastroparesis      History of chronic fatigue syndrome      Anxiety      H/O hernia repair  umbilical      S/P endoscopy      S/P colonoscopy      S/P biopsy  face      Status post anal fissurectomy      Elective surgery  anal botox injection 2023 in [facility], per pt she was intubated and was in ICU for 3 days after surgery          MEDICATIONS  (STANDING):    MEDICATIONS  (PRN):      Allergies    ceftriaxone (Rash)  Seroquel (Unknown)  fish (Nausea)  Vraylar (Vomiting)  lactose (Other)  Lamictal (Rash)  clindamycin (Vomiting)  doxycycline (Vomiting; Nausea)  sertraline (Other)  fluoxetine (Other)  amitriptyline (Other)    Intolerances        SOCIAL HISTORY:    FAMILY HISTORY:      Vital Signs Last 24 Hrs  T(C): 36.7 (2023 19:13), Max: 36.7 (2023 19:13)  T(F): 98.1 (2023 19:13), Max: 98.1 (2023 19:13)  HR: 90 (2023 19:13) (90 - 90)  BP: 133/86 (2023 19:13) (133/86 - 133/86)  BP(mean): --  RR: 20 (2023 19:13) (20 - 20)  SpO2: 97% (2023 19:13) (97% - 97%)    Parameters below as of 2023 19:13  Patient On (Oxygen Delivery Method): room air        On PE:  General: alert and awake  Abdomen: mod tender mid abdomen and SP area, no rebound/guarding    : no CVAT    EXT: no c/c/e    LABS:                        14.2   6.94  )-----------( 211      ( 2023 20:23 )             41.8         141  |  103  |  11  ----------------------------<  92  3.6   |  26  |  0.68    Ca    9.9      2023 20:23    TPro  7.3  /  Alb  4.7  /  TBili  0.3  /  DBili  0.2  /  AST  14  /  ALT  9<L>  /  AlkPhos  48      PT/INR - ( 2023 20:23 )   PT: 11.3 sec;   INR: 0.99          PTT - ( 2023 20:23 )  PTT:33.3 sec  Urinalysis Basic - ( 2023 20:23 )    Color: Orange / Appearance: Turbid / S.015 / pH: x  Gluc: 92 mg/dL / Ketone: 40 mg/dL  / Bili: Negative / Urobili: 1.0 mg/dL   Blood: x / Protein: 30 mg/dL / Nitrite: Negative   Leuk Esterase: Moderate / RBC: >1900 /HPF / WBC 54 /HPF   Sq Epi: x / Non Sq Epi: 1 /HPF / Bacteria: Negative /HPF        RADIOLOGY & ADDITIONAL STUDIES: HPI: 22 yo female with PMH of chronic fatigue syndrome, delayed gastric emptying, fibromyalgia, GERD, IBS, iron deficiency anemia, migraines with  hx of pelvic floor dysfunction, urinary retention (straight cath's about 3x/day), chronic UTI's (on po keflex daily and weekly bladder tobramycin instillation) presents to ED c/o hematuria x 1 week now. Hematuria occurs on and off at this time.  Reports hx of hematuria in the setting of UTI. Reports +mid to lower abdominal discomfort, felt light headed today. No fever/chills. No dysuria. No N/V. Prior see Reports following Dr. Chand at [place]. She came to ED due to concern of the amount of hematuria. She states that she chronically retains at home even with CIC. She had negative urine culture on 11/3. Her last cystoscopy in 2023 from outside records was negative. She was scheduled to have CT urogram later this week outpatient.       PAST MEDICAL & SURGICAL HISTORY:  Severe anxiety      H/O headache      Asthma      Acid reflux      H/O fibromyalgia      Anemia      Constipation      History of nausea      IBS (irritable bowel syndrome)      Rosacea      Amplified musculoskeletal pain syndrome      Arthralgia of both knees      JUAN positive      Bronchospasm  acute      Delayed gastric emptying      Pelvic floor dysfunction      Gastroparesis      History of chronic fatigue syndrome      Anxiety      H/O hernia repair  umbilical      S/P endoscopy      S/P colonoscopy      S/P biopsy  face      Status post anal fissurectomy      Elective surgery  anal botox injection 2023 in [facility], per pt she was intubated and was in ICU for 3 days after surgery          MEDICATIONS  (STANDING):    MEDICATIONS  (PRN):      Allergies    ceftriaxone (Rash)  Seroquel (Unknown)  fish (Nausea)  Vraylar (Vomiting)  lactose (Other)  Lamictal (Rash)  clindamycin (Vomiting)  doxycycline (Vomiting; Nausea)  sertraline (Other)  fluoxetine (Other)  amitriptyline (Other)    Intolerances        SOCIAL HISTORY:    FAMILY HISTORY:      Vital Signs Last 24 Hrs  T(C): 36.7 (2023 19:13), Max: 36.7 (2023 19:13)  T(F): 98.1 (2023 19:13), Max: 98.1 (2023 19:13)  HR: 90 (2023 19:13) (90 - 90)  BP: 133/86 (2023 19:13) (133/86 - 133/86)  BP(mean): --  RR: 20 (2023 19:13) (20 - 20)  SpO2: 97% (2023 19:13) (97% - 97%)    Parameters below as of 2023 19:13  Patient On (Oxygen Delivery Method): room air        On PE:  General: alert and awake  Abdomen: mod tender mid abdomen and SP area, no rebound/guarding    : no CVAT b/l     EXT: no c/c/e    LABS:                        14.2   6.94  )-----------( 211      ( 2023 20:23 )             41.8     11-    141  |  103  |  11  ----------------------------<  92  3.6   |  26  |  0.68    Ca    9.9      2023 20:23    TPro  7.3  /  Alb  4.7  /  TBili  0.3  /  DBili  0.2  /  AST  14  /  ALT  9<L>  /  AlkPhos  48  11-09    PT/INR - ( 2023 20:23 )   PT: 11.3 sec;   INR: 0.99          PTT - ( 2023 20:23 )  PTT:33.3 sec  Urinalysis Basic - ( 2023 20:23 )    Color: Orange / Appearance: Turbid / S.015 / pH: x  Gluc: 92 mg/dL / Ketone: 40 mg/dL  / Bili: Negative / Urobili: 1.0 mg/dL   Blood: x / Protein: 30 mg/dL / Nitrite: Negative   Leuk Esterase: Moderate / RBC: >1900 /HPF / WBC 54 /HPF   Sq Epi: x / Non Sq Epi: 1 /HPF / Bacteria: Negative /HPF        RADIOLOGY & ADDITIONAL STUDIES: HPI: 24 yo female with PMH of chronic fatigue syndrome, delayed gastric emptying, fibromyalgia, GERD, IBS, iron deficiency anemia, migraines with  hx of pelvic floor dysfunction, urinary retention (straight cath's about 3x/day), chronic UTI's (on po keflex daily and weekly bladder tobramycin instillation) presents to ED c/o hematuria x 1 week now. Hematuria occurs on and off at this time.  Reports hx of hematuria in the setting of UTI. Reports +mid to lower abdominal discomfort, felt light headed today. No fever/chills. No dysuria. No N/V. Prior see Reports following Dr. Chand at [place]. She came to ED due to concern of the amount of hematuria. She states that she chronically retains at home even with CIC. She had negative urine culture on 11/3. Her last cystoscopy in 2023 from outside records was negative. She was scheduled to have CT urogram later this week outpatient.       PAST MEDICAL & SURGICAL HISTORY:  Severe anxiety      H/O headache      Asthma      Acid reflux      H/O fibromyalgia      Anemia      Constipation      History of nausea      IBS (irritable bowel syndrome)      Rosacea      Amplified musculoskeletal pain syndrome      Arthralgia of both knees      JUAN positive      Bronchospasm  acute      Delayed gastric emptying      Pelvic floor dysfunction      Gastroparesis      History of chronic fatigue syndrome      Anxiety      H/O hernia repair  umbilical      S/P endoscopy      S/P colonoscopy      S/P biopsy  face      Status post anal fissurectomy      Elective surgery  anal botox injection 2023 in [facility], per pt she was intubated and was in ICU for 3 days after surgery          MEDICATIONS  (STANDING):    MEDICATIONS  (PRN):      Allergies    ceftriaxone (Rash)  Seroquel (Unknown)  fish (Nausea)  Vraylar (Vomiting)  lactose (Other)  Lamictal (Rash)  clindamycin (Vomiting)  doxycycline (Vomiting; Nausea)  sertraline (Other)  fluoxetine (Other)  amitriptyline (Other)    Intolerances        SOCIAL HISTORY:    FAMILY HISTORY:      Vital Signs Last 24 Hrs  T(C): 36.7 (2023 19:13), Max: 36.7 (2023 19:13)  T(F): 98.1 (2023 19:13), Max: 98.1 (2023 19:13)  HR: 90 (2023 19:13) (90 - 90)  BP: 133/86 (2023 19:13) (133/86 - 133/86)  BP(mean): --  RR: 20 (2023 19:13) (20 - 20)  SpO2: 97% (2023 19:13) (97% - 97%)    Parameters below as of 2023 19:13  Patient On (Oxygen Delivery Method): room air        On PE:  General: alert and awake  Abdomen: mod tender mid abdomen and SP area, no rebound/guarding    : no CVAT b/l     EXT: no c/c/e    LABS:                        14.2   6.94  )-----------( 211      ( 2023 20:23 )             41.8         141  |  103  |  11  ----------------------------<  92  3.6   |  26  |  0.68    Ca    9.9      2023 20:23    TPro  7.3  /  Alb  4.7  /  TBili  0.3  /  DBili  0.2  /  AST  14  /  ALT  9<L>  /  AlkPhos  48      PT/INR - ( 2023 20:23 )   PT: 11.3 sec;   INR: 0.99          PTT - ( 2023 20:23 )  PTT:33.3 sec  Urinalysis Basic - ( 2023 20:23 )    Color: Orange / Appearance: Turbid / S.015 / pH: x  Gluc: 92 mg/dL / Ketone: 40 mg/dL  / Bili: Negative / Urobili: 1.0 mg/dL   Blood: x / Protein: 30 mg/dL / Nitrite: Negative   Leuk Esterase: Moderate / RBC: >1900 /HPF / WBC 54 /HPF   Sq Epi: x / Non Sq Epi: 1 /HPF / Bacteria: Negative /HPF        RADIOLOGY & ADDITIONAL STUDIES:    < from: CT Abdomen and Pelvis w/wo IV Cont (23 @ 23:01) >    ACC: [id number] EXAM:  CT ABDOMEN AND PELVIS WAW IC   ORDERED BY: DELANO GRIGSBY     PROCEDURE DATE:  2023          INTERPRETATION:  CLINICAL INFORMATION: Hematuria    ADDITIONAL CLINICAL INFORMATION: Gross Hematuria R31.0    COMPARISON: CT abdomen/pelvis 5/15/2022    CONTRAST/COMPLICATIONS:  IV Contrast: Isovue 370  140 cc administered   60 cc discarded  Oral Contrast: NONE  Complications: None reported at time of study completion    PROCEDURE:  CT of the Abdomen and Pelvis was performed.  Precontrast and combined Nephrographic/Excretory phases were acquired (CT   UROGRAM).  10 mg of Lasix was administered.  Sagittal and coronal reformats were performed.    FINDINGS:  LOWER CHEST: Within normal limits.    LIVER: Within normal limits.  BILE DUCTS: Normal caliber.  GALLBLADDER: Within normal limits.  SPLEEN: Within normal limits.  PANCREAS: Within normal limits.  ADRENALS: Within normal limits.  KIDNEYS/URETERS: There is a 7mm nodular filling defect within a lower   pole right renal calyx. No calculi or hydronephrosis.    BLADDER: Within normal limits.  REPRODUCTIVE ORGANS: Normal uterus and ovaries. IUD in the endometrial   cavity. 2.2 cm right corpus luteum.    BOWEL: No bowel obstruction. Appendix is normal.  PERITONEUM: Trace pelvic ascites.  VESSELS: Within normal limits.  RETROPERITONEUM/LYMPH NODES: No lymphadenopathy.  ABDOMINAL WALL: Within normal limits.  BONES: Within normal limits.    IMPRESSION:  There is a 7mm nodular filling defect within a lower pole right renal   calyx. This could represent a urothelial lesion versus artifact.   Correlation with cystoscopy is recommended.        --- End of Report ---          SAUL CABRERA MD; Resident Radiologist  This document has been electronically signed.  MURRAY HEREDIA MD; Attending Radiologist  This document has been electronically signed. Nov 10 2023  2:33AM    < end of copied text >  < from: US Pelvis Limited or Follow Up (11.10.23 @ 05:35) >      ACC: [id number] EXAM:  US PELVIC NON OBS LIMITED   ORDERED BY: DELANO GRIGSBY     PROCEDURE DATE:  11/10/2023    ******PRELIMINARY REPORT******      ******PRELIMINARY REPORT******           INTERPRETATION:  BLADDER ULTRASOUND dated 11/10/2023 5:35 AM    INDICATION: Pre and post void bladder scan. Evaluate for retained urine.    PRIOR STUDIES: CT abdomen/pelvis 2023    FINDINGS:    URINARY BLADDER:  Ureteral jets: Both visible.  Filling defects: None  Bladder volume: Pre-void: 57 ml; Post-void: 11 ml.      IMPRESSION:  Normal post void residual bladder volume.    < end of copied text >

## 2023-11-09 NOTE — CONSULT NOTE ADULT - ASSESSMENT
22 yo female c/o hematuria; H/O  pelvic floor dysfunction, urinary retention (straight cath's about 3x/day), chronic UTI's (on po keflex daily), and  chronic pain syndrome. Pascal catheter #20fr placed in ED with clear urine, irrigated with no clots noted.  24 yo female c/o hematuria; H/O  pelvic floor dysfunction, urinary retention (straight cath's about 3x/day), chronic UTI's (on po keflex daily), and  chronic pain syndrome. Chung catheter #20fr placed in ED with clear urine, irrigated with no clots noted. No leukocytosis Hgb 14.2, Cr 0.6. UA moderate LE, large blood, WBC 54,negative nitrite. Hematuria likely in the setting of UTI. CT urogram ordered in the ED. Pt states she has horrible pain from chung catheter and denied having one to monitor urine output and bladder emptying.     Recs:  F/u CTU  F/u urine culture  Acute urinary retention can be rule out if BS after self catheretization is <200 Treat presumed UTI with abx per ED   F/u outpatient with Dr. Chand [place] urologist for repeat UA and further hematuria work up 22 yo female c/o hematuria; H/O  pelvic floor dysfunction, urinary retention (straight cath's about 3x/day), chronic UTI's (on po keflex daily), and  chronic pain syndrome. Pascal catheter #20fr placed in ED with clear urine, irrigated with no clots noted. No leukocytosis Hgb 14.2, Cr 0.6. UA moderate LE, large blood, WBC 54,negative nitrite. Hematuria likely in the setting of UTI. PVR 11cc which rules out retention. CT urogram shows possible 7mm filling defect in the right lower pole calyx versus artificat, no hydro b/l.     Recs:  Given that pt is not in retention, no leukocytosis, and is clinically stable,  F/u urine culture  Abx per primary team for presumed UTI   Return precautions of any signs of clot retention or worsening clinical status    F/u outpatient with Dr. Chand [place] urologist for further work up   Discussed with attending  24 yo female c/o hematuria; H/O  pelvic floor dysfunction, urinary retention (straight cath's about 3x/day), chronic UTI's (on po keflex daily), and  chronic pain syndrome. Pasacl catheter #20fr placed in ED with clear urine, irrigated with no clots noted. No leukocytosis Hgb 14.2, Cr 0.6. UA moderate LE, large blood, WBC 54,negative nitrite. Hematuria likely in the setting of UTI. PVR 11cc which rules out retention. CT urogram shows possible 7mm filling defect in the right lower pole calyx versus artificat, no hydro b/l.     Recs:  Given that pt is not in retention, no leukocytosis, and is clinically stable, no acute urological intervention at this time   F/u urine culture  Abx per primary team for presumed UTI   Return precautions of any signs of clot retention or worsening clinical status    F/u outpatient with Dr. Chand [place] urologist or Dr. Caballero for outpatient further work up   Discussed with attending

## 2023-11-09 NOTE — ED ADULT NURSE NOTE - NSICDXPASTSURGICALHX_GEN_ALL_CORE_FT
PAST SURGICAL HISTORY:  Elective surgery anal botox injection 1/2023 in [facility], per pt she was intubated and was in ICU for 3 days after surgery    H/O hernia repair umbilical    S/P biopsy face    S/P colonoscopy     S/P endoscopy     Status post anal fissurectomy

## 2023-11-09 NOTE — CONSULT NOTE ADULT - PROBLEM SELECTOR RECOMMENDATION 9
with probable UTI  -stable  -f/u Ucx  -f/u CT urogram  -can f/u with her urologist at [place], will need outpt Cystoscopy  -change po antibiotic

## 2023-11-10 ENCOUNTER — NON-APPOINTMENT (OUTPATIENT)
Age: 23
End: 2023-11-10

## 2023-11-10 VITALS
OXYGEN SATURATION: 99 % | RESPIRATION RATE: 18 BRPM | HEART RATE: 80 BPM | DIASTOLIC BLOOD PRESSURE: 76 MMHG | TEMPERATURE: 98 F | SYSTOLIC BLOOD PRESSURE: 114 MMHG

## 2023-11-10 PROCEDURE — 80076 HEPATIC FUNCTION PANEL: CPT

## 2023-11-10 PROCEDURE — 87086 URINE CULTURE/COLONY COUNT: CPT

## 2023-11-10 PROCEDURE — 99284 EMERGENCY DEPT VISIT MOD MDM: CPT | Mod: 25

## 2023-11-10 PROCEDURE — 74178 CT ABD&PLV WO CNTR FLWD CNTR: CPT | Mod: MG

## 2023-11-10 PROCEDURE — 76857 US EXAM PELVIC LIMITED: CPT | Mod: 26

## 2023-11-10 PROCEDURE — 36415 COLL VENOUS BLD VENIPUNCTURE: CPT

## 2023-11-10 PROCEDURE — 96376 TX/PRO/DX INJ SAME DRUG ADON: CPT

## 2023-11-10 PROCEDURE — 85610 PROTHROMBIN TIME: CPT

## 2023-11-10 PROCEDURE — 85025 COMPLETE CBC W/AUTO DIFF WBC: CPT

## 2023-11-10 PROCEDURE — 85730 THROMBOPLASTIN TIME PARTIAL: CPT

## 2023-11-10 PROCEDURE — G1004: CPT

## 2023-11-10 PROCEDURE — 82550 ASSAY OF CK (CPK): CPT

## 2023-11-10 PROCEDURE — 81001 URINALYSIS AUTO W/SCOPE: CPT

## 2023-11-10 PROCEDURE — 96374 THER/PROPH/DIAG INJ IV PUSH: CPT | Mod: XU

## 2023-11-10 PROCEDURE — 80048 BASIC METABOLIC PNL TOTAL CA: CPT

## 2023-11-10 PROCEDURE — 96375 TX/PRO/DX INJ NEW DRUG ADDON: CPT

## 2023-11-10 PROCEDURE — 76857 US EXAM PELVIC LIMITED: CPT

## 2023-11-10 PROCEDURE — 84702 CHORIONIC GONADOTROPIN TEST: CPT

## 2023-11-10 RX ORDER — ACETAMINOPHEN 500 MG
1000 TABLET ORAL ONCE
Refills: 0 | Status: COMPLETED | OUTPATIENT
Start: 2023-11-10 | End: 2023-11-10

## 2023-11-10 RX ORDER — CIPROFLOXACIN LACTATE 400MG/40ML
400 VIAL (ML) INTRAVENOUS ONCE
Refills: 0 | Status: COMPLETED | OUTPATIENT
Start: 2023-11-10 | End: 2023-11-10

## 2023-11-10 RX ORDER — MORPHINE SULFATE 50 MG/1
2 CAPSULE, EXTENDED RELEASE ORAL ONCE
Refills: 0 | Status: DISCONTINUED | OUTPATIENT
Start: 2023-11-10 | End: 2023-11-10

## 2023-11-10 RX ORDER — KETOROLAC TROMETHAMINE 30 MG/ML
15 SYRINGE (ML) INJECTION ONCE
Refills: 0 | Status: DISCONTINUED | OUTPATIENT
Start: 2023-11-10 | End: 2023-11-10

## 2023-11-10 RX ORDER — CIPROFLOXACIN LACTATE 400MG/40ML
1 VIAL (ML) INTRAVENOUS
Qty: 14 | Refills: 0
Start: 2023-11-10 | End: 2023-11-16

## 2023-11-10 RX ADMIN — MORPHINE SULFATE 2 MILLIGRAM(S): 50 CAPSULE, EXTENDED RELEASE ORAL at 03:38

## 2023-11-10 RX ADMIN — Medication 400 MILLIGRAM(S): at 02:26

## 2023-11-10 RX ADMIN — Medication 200 MILLIGRAM(S): at 03:38

## 2023-11-10 NOTE — ED PROVIDER NOTE - PHYSICAL EXAMINATION
Constitutional: awake and alert, in no acute distress  HEENT: head normocephalic and atraumatic. moist mucous membranes  Eyes: extraocular movements intact, normal conjunctiva  Neck: supple, normal ROM  Cardiovascular: regular rate   Pulmonary: no respiratory distress  Gastrointestinal: abdomen flat and nondistended, minimally tender to mid pelvic area  : no vag. bleeding, no abnormal vaginal discharge, IUD strings visualized, no CMT, no adnexal mass palpated  Skin: warm, dry, normal for ethnicity  Musculoskeletal: no edema, no deformity, NROM  Neurological: oriented x4, no focal neurologic deficit.   Psychiatric: calm and cooperative, no SI/HI

## 2023-11-10 NOTE — PROGRESS NOTE ADULT - SUBJECTIVE AND OBJECTIVE BOX
UROLOGY    CT urogram results: < from: CT Abdomen and Pelvis w/wo IV Cont (11.09.23 @ 23:01) >  IMPRESSION:  There is a 7mm nodular filling defect within a lower pole right renal   calyx. This could represent a urothelial lesion versus artifact.   Correlation with cystoscopy is recommended.      < end of copied text >    Plan: no active bleeding  f/u bladder scan. If not retaining after voiding/straight cath can d/c home on po cipro and f/u  outpt with pt's urologist or at urology clinic.

## 2023-11-10 NOTE — ED PROVIDER NOTE - NSFOLLOWUPINSTRUCTIONS_ED_ALL_ED_FT
Thank you for visiting [facility] Emergency Department.    We saw you today for hematuria and abdominal pain.    Please continue taking all medications as instructed  and follow up with your urologist for re-evaluation and further treatment.   I appreciated your patience and hope you feel better soon.   Return to ER immediately if you develop fevers, chills, chest pain, shortness of breath, worsening dizziness, and/or any concerning symptoms.    Urinary Tract Infection in Women    WHAT YOU NEED TO KNOW:    A urinary tract infection (UTI) is caused by bacteria that get inside your urinary tract. Your urinary tract includes your kidneys, ureters, bladder, and urethra. A UTI is more common in your lower urinary tract, which includes your bladder and urethra.  Female Urinary System    DISCHARGE INSTRUCTIONS:    Seek care immediately if:    You are urinating very little or not at all.    You have a high fever with shaking chills.    You have side or back pain that gets worse.  Call your doctor if:    You have a fever.    You do not feel better after 2 days of taking antibiotics.    You have new symptoms, such as blood or pus in your urine.    You are vomiting.    You have questions or concerns about your condition or care.  Medicines:    Antibiotics treat a bacterial infection. If you have UTIs often (called recurrent UTIs), you may be given antibiotics to take regularly. You will be given directions for when and how to use antibiotics. The goal is to prevent UTIs but not cause antibiotic resistance by using antibiotics too often.    Medicines may be given to decrease pain and burning when you urinate. They will also help decrease the feeling that you need to urinate often. These medicines may make your urine orange or red.    Take your medicine as directed. Contact your healthcare provider if you think your medicine is not helping or if you have side effects. Tell your provider if you are allergic to any medicine. Keep a list of the medicines, vitamins, and herbs you take. Include the amounts, and when and why you take them. Bring the list or the pill bottles to follow-up visits. Carry your medicine list with you in case of an emergency.  Follow up with your doctor as directed: Write down your questions so you remember to ask them during your visits.    Prevent another UTI:    Empty your bladder often. Urinate and empty your bladder as soon as you feel the need. Do not hold your urine for long periods of time.    Wipe from front to back after you urinate or have a bowel movement. This will help prevent germs from getting into your urinary tract through your urethra.    Drink liquids as directed. Ask how much liquid to drink each day and which liquids are best for you. You may need to drink more liquids than usual to help flush out the bacteria. Do not drink alcohol, caffeine, or citrus juices. These can irritate your bladder and increase your symptoms. Your healthcare provider may recommend cranberry juice to help prevent a UTI.    Urinate before and after you have sex. This can help flush out bacteria passed during sex.    Do not douche or use feminine deodorants. These can change the chemical balance in your vagina.    Change sanitary pads or tampons often. This will help prevent germs from getting into your urinary tract.    Talk to your healthcare provider about your birth control method. You may need to change your method if it is increasing your risk for UTIs.    Wear cotton underwear and clothes that are loose. Tight pants and nylon underwear can trap moisture and cause bacteria to grow.    Vaginal estrogen may be recommended. This medicine helps prevent UTIs in women who have gone through menopause or are in valentina-menopause.    Do pelvic muscle exercises often. Pelvic muscle exercises may help you start and stop urinating. Strong pelvic muscles may help you empty your bladder easier. Squeeze these muscles tightly for 5 seconds like you are trying to hold back urine. Then relax for 5 seconds. Gradually work up to squeezing for 10 seconds. Do 3 sets of 15 repetitions a day, or as directed.

## 2023-11-10 NOTE — ED PROVIDER NOTE - PATIENT PORTAL LINK FT
You can access the FollowMyHealth Patient Portal offered by [facility] by registering at the following website: [email or web address]. By joining [employer]’s FollowMyHealth portal, you will also be able to view your health information using other applications (apps) compatible with our system.

## 2023-11-10 NOTE — ED PROVIDER NOTE - CLINICAL SUMMARY MEDICAL DECISION MAKING FREE TEXT BOX
22 yo female with PMH of chronic fatigue syndrome, delayed gastric emptying, fibromyalgia, GERD, IBS, iron deficiency anemia, migraines with  hx of pelvic floor dysfunction, urinary retention (straight cath's about 3x/day), chronic UTI's (on po keflex daily and weekly bladder tobramycin instillation) presents to ED c/o hematuria x 1 week now.  Reports +mid to lower abdominal discomfort, felt light headed today. No fever/chills. No dysuria. No N/V. Pt states her urologist is Dr. Chand at [place]. She came to ED due to concern of the amount of hematuria.  She had negative urine culture on 11/3. Her last cystoscopy in January 2023  was normal , as per pt.  Pt states she was scheduled to have CT urogram later this week outpatient.   Pt afebrile and non-toxic appearing. Has no vag. bleeding on pelvic exam, no signs of pelvic infection. UA highly suspicious for UTI. pt seen and evaluated by urology team on call. d/c home on Cipro and f/u with urologist for cystoscopy recommended.

## 2023-11-10 NOTE — ED PROVIDER NOTE - NSICDXPASTSURGICALHX_GEN_ALL_CORE_FT
PAST SURGICAL HISTORY:  Elective surgery anal botox injection 1/2023 in [facility], per pt she was intubated and was in ICU for 3 days after surgery    H/O hernia repair umbilical    S/P biopsy face    S/P colonoscopy     S/P endoscopy     Status post anal fissurectomy

## 2023-11-10 NOTE — ED PROVIDER NOTE - NS ED ATTENDING STATEMENT MOD
This was a shared visit with the MANDEEP. I reviewed and verified the documentation and independently performed the documented:

## 2023-11-10 NOTE — ED PROVIDER NOTE - OBJECTIVE STATEMENT
24 yo female with PMH of chronic fatigue syndrome, delayed gastric emptying, fibromyalgia, GERD, IBS, iron deficiency anemia, migraines with  hx of pelvic floor dysfunction, urinary retention (straight cath's about 3x/day), chronic UTI's (on po keflex daily and weekly bladder tobramycin instillation) presents to ED c/o hematuria x 1 week now.  Reports +mid to lower abdominal discomfort, felt light headed today. No fever/chills. No dysuria. No N/V. Pt states her urologist is Dr. Chand at [place]. She came to ED due to concern of the amount of hematuria.  She had negative urine culture on 11/3. Her last cystoscopy in January 2023  was normal , as per pt.  Pt states she was scheduled to have CT urogram later this week outpatient.

## 2023-11-10 NOTE — ED PROVIDER NOTE - ATTENDING APP SHARED VISIT CONTRIBUTION OF CARE
22 yo female with PMH of chronic fatigue syndrome, delayed gastric emptying, fibromyalgia, GERD, IBS, iron deficiency anemia, migraines with  hx of pelvic floor dysfunction, urinary retention (straight cath's about 3x/day), chronic UTI's (on po keflex daily and weekly bladder tobramycin instillation) presents to ED c/o hematuria x 1 week now.  Reports +mid to lower abdominal discomfort, felt light headed today. No fever/chills. No dysuria. No N/V. Pt states her urologist is Dr. Chand at [place]. She came to ED due to concern of the amount of hematuria.  She had negative urine culture on 11/3. Her last cystoscopy in January 2023  was normal , as per pt.  Pt states she was scheduled to have CT urogram later this week outpatient.   Pt afebrile and non-toxic appearing. Has no vag. bleeding on pelvic exam, no signs of pelvic infection. UA highly suspicious for UTI. pt seen and evaluated by urology team on call. d/c home on Cipro and f/u with urologist for cystoscopy recommended.

## 2023-11-11 LAB
CULTURE RESULTS: SIGNIFICANT CHANGE UP
CULTURE RESULTS: SIGNIFICANT CHANGE UP
SPECIMEN SOURCE: SIGNIFICANT CHANGE UP
SPECIMEN SOURCE: SIGNIFICANT CHANGE UP

## 2023-11-15 ENCOUNTER — APPOINTMENT (OUTPATIENT)
Dept: COLORECTAL SURGERY | Facility: HOSPITAL | Age: 23
End: 2023-11-15

## 2023-11-20 ENCOUNTER — APPOINTMENT (OUTPATIENT)
Dept: GASTROENTEROLOGY | Facility: CLINIC | Age: 23
End: 2023-11-20

## 2023-11-20 RX ORDER — APREPITANT 40 MG/1
40 CAPSULE ORAL
Qty: 60 | Refills: 5 | Status: ACTIVE | COMMUNITY
Start: 1900-01-01 | End: 1900-01-01

## 2023-11-20 RX ORDER — CYPROHEPTADINE HYDROCHLORIDE 4 MG/1
4 TABLET ORAL
Qty: 60 | Refills: 5 | Status: ACTIVE | COMMUNITY
Start: 1900-01-01 | End: 1900-01-01

## 2023-12-08 NOTE — ED ADULT NURSE NOTE - NS_NURSE_DISC_TEACHING_YN_ED_ALL_ED
Lumbar Epidural Steroid Injection  Patient Discharge Instructions      When [facility] should not drive the day of the procedure. You may experience leg weakness during the first 24 hours following the procedure. To prevent yourself from falling, it is important to have someone help you walk. However, you do not need to stay in bed when you get home. In fact, it is best to walk around if you feel up to it, but you will need assistance during the first 24 hours following the epidural steroid injection. Even if you feel better right away, avoid activities that may strain your back. Keep in mind that most patients feel increased pain for the first 24 hours. You should start feeling some pain relief 2-3 days following the injection. This is because the steroid will start working within three days of the injection with maximal effect by one week. At that time, we will evaluate your pain level to determine the need for another steroid injection. Remove bandaid(s) within 24 hours. When to Call Your Doctor    Call right away if you notice any of the following symptoms:    Severe pain or headache;  Fever or chills; Redness or swelling around the injection site. Loss of bladder or bowel control. You may contact [address] [place] [address] for any questions or problems that may occur at [phone] during the hours of 9am-5pm Monday-Friday, or the hospital  after hours at [phone], to have the interventional radiologist on call paged. [facility]  Cardiovascular Special Procedures  General Discharge Instructions    PROCEDURE: ____________________________________________________    ____ Urbanotierra David may be drowsy or lightheaded after receiving sedation.  DO NOT operate a vehicle (automobile, bicycle, motorcycle, machinery, or power tools), make any important decisions or sign any important/legal documents, or drink alcoholic beverages for the next 24 hours  ____ We
Yes

## 2023-12-12 ENCOUNTER — NON-APPOINTMENT (OUTPATIENT)
Age: 23
End: 2023-12-12

## 2024-07-22 NOTE — ED ADULT TRIAGE NOTE - WEIGHT METHOD
Increase amlodipine to 10mg daily.  Recheck lipid panel in 6 months. Work on diet and exercise. Low fat/low cholesterol diet.       If you were prescribed a medication, please read your medication instructions carefully. Any common side effects or adverse effects may be listed on your prescription when you receive it. If you have any questions or concerns, please do not hesitate to reach our office for further information.     Please call 911 or go to the Emergency Room immediately if you are experiencing any worsening or worrisome symptoms.     Thank you for choosing [facility] for your health care needs.    Tony Hancock NP-C    stated

## 2024-08-28 NOTE — PATIENT PROFILE ADULT - FUNCTIONAL ASSESSMENT - BASIC MOBILITY 5.
Patient notified at this time of positive urine culture results. Per Dr. Bailey they should start Cipro 250mg BID x 7 days. Prescription sent to preferred pharmacy. Patient reminded to drink 48-64 ounces of plain water daily and ensure they are having a soft bowel movement daily.   3 = A little assistance

## 2025-04-01 ENCOUNTER — APPOINTMENT (OUTPATIENT)
Dept: UROLOGY | Facility: CLINIC | Age: 25
End: 2025-04-01
Payer: COMMERCIAL

## 2025-04-01 ENCOUNTER — NON-APPOINTMENT (OUTPATIENT)
Age: 25
End: 2025-04-01

## 2025-04-01 ENCOUNTER — OUTPATIENT (OUTPATIENT)
Dept: OUTPATIENT SERVICES | Facility: HOSPITAL | Age: 25
LOS: 1 days | End: 2025-04-01
Payer: COMMERCIAL

## 2025-04-01 VITALS — HEART RATE: 94 BPM | DIASTOLIC BLOOD PRESSURE: 76 MMHG | SYSTOLIC BLOOD PRESSURE: 110 MMHG

## 2025-04-01 DIAGNOSIS — Z98.890 OTHER SPECIFIED POSTPROCEDURAL STATES: Chronic | ICD-10-CM

## 2025-04-01 DIAGNOSIS — Z41.9 ENCOUNTER FOR PROCEDURE FOR PURPOSES OTHER THAN REMEDYING HEALTH STATE, UNSPECIFIED: Chronic | ICD-10-CM

## 2025-04-01 DIAGNOSIS — R30.0 DYSURIA: ICD-10-CM

## 2025-04-01 PROCEDURE — 51700 IRRIGATION OF BLADDER: CPT

## 2025-04-01 PROCEDURE — 99205 OFFICE O/P NEW HI 60 MIN: CPT

## 2025-04-01 PROCEDURE — C2627: CPT

## 2025-04-01 PROCEDURE — 99459 PELVIC EXAMINATION: CPT

## 2025-04-01 PROCEDURE — 51710 CHANGE OF BLADDER TUBE: CPT

## 2025-04-01 PROCEDURE — 51700 IRRIGATION OF BLADDER: CPT | Mod: 59

## 2025-04-01 PROCEDURE — G2211 COMPLEX E/M VISIT ADD ON: CPT | Mod: NC

## 2025-04-01 RX ORDER — LIDOCAINE HYDROCHLORIDE 20 MG/ML
2 JELLY TOPICAL
Qty: 1 | Refills: 3 | Status: ACTIVE | COMMUNITY
Start: 2025-04-01 | End: 1900-01-01

## 2025-04-02 LAB
APPEARANCE: CLEAR
BACTERIA: NEGATIVE /HPF
BILIRUBIN URINE: NEGATIVE
BLOOD URINE: NEGATIVE
CAST: 0 /LPF
COLOR: YELLOW
EPITHELIAL CELLS: 1 /HPF
GLUCOSE QUALITATIVE U: NEGATIVE MG/DL
KETONES URINE: NEGATIVE MG/DL
LEUKOCYTE ESTERASE URINE: NEGATIVE
MICROSCOPIC-UA: NORMAL
NITRITE URINE: NEGATIVE
PH URINE: 6
PROTEIN URINE: NEGATIVE MG/DL
RED BLOOD CELLS URINE: 1 /HPF
SPECIFIC GRAVITY URINE: 1.01
UROBILINOGEN URINE: 0.2 MG/DL
WHITE BLOOD CELLS URINE: 1 /HPF

## 2025-04-03 LAB — BACTERIA UR CULT: NORMAL

## 2025-04-07 DIAGNOSIS — R33.9 RETENTION OF URINE, UNSPECIFIED: ICD-10-CM

## 2025-04-07 DIAGNOSIS — R30.0 DYSURIA: ICD-10-CM

## 2025-04-07 DIAGNOSIS — N30.10 INTERSTITIAL CYSTITIS (CHRONIC) WITHOUT HEMATURIA: ICD-10-CM

## 2025-04-15 ENCOUNTER — OUTPATIENT (OUTPATIENT)
Dept: OUTPATIENT SERVICES | Facility: HOSPITAL | Age: 25
LOS: 1 days | End: 2025-04-15
Payer: COMMERCIAL

## 2025-04-15 VITALS
DIASTOLIC BLOOD PRESSURE: 76 MMHG | HEIGHT: 64 IN | TEMPERATURE: 98 F | HEART RATE: 79 BPM | WEIGHT: 125 LBS | SYSTOLIC BLOOD PRESSURE: 106 MMHG | OXYGEN SATURATION: 97 % | RESPIRATION RATE: 14 BRPM

## 2025-04-15 DIAGNOSIS — R30.0 DYSURIA: ICD-10-CM

## 2025-04-15 DIAGNOSIS — Z98.890 OTHER SPECIFIED POSTPROCEDURAL STATES: Chronic | ICD-10-CM

## 2025-04-15 DIAGNOSIS — J98.01 ACUTE BRONCHOSPASM: ICD-10-CM

## 2025-04-15 DIAGNOSIS — Z93.1 GASTROSTOMY STATUS: Chronic | ICD-10-CM

## 2025-04-15 DIAGNOSIS — Z41.9 ENCOUNTER FOR PROCEDURE FOR PURPOSES OTHER THAN REMEDYING HEALTH STATE, UNSPECIFIED: Chronic | ICD-10-CM

## 2025-04-15 DIAGNOSIS — Z01.818 ENCOUNTER FOR OTHER PREPROCEDURAL EXAMINATION: ICD-10-CM

## 2025-04-15 PROCEDURE — G0463: CPT

## 2025-04-15 PROCEDURE — 80048 BASIC METABOLIC PNL TOTAL CA: CPT

## 2025-04-15 PROCEDURE — 85027 COMPLETE CBC AUTOMATED: CPT

## 2025-04-15 RX ORDER — LIDOCAINE HCL/PF 10 MG/ML
0.2 VIAL (ML) INJECTION ONCE
Refills: 0 | Status: DISCONTINUED | OUTPATIENT
Start: 2025-04-21 | End: 2025-04-21

## 2025-04-15 RX ORDER — CIPROFLOXACIN HCL 250 MG
400 TABLET ORAL ONCE
Refills: 0 | Status: DISCONTINUED | OUTPATIENT
Start: 2025-04-21 | End: 2025-05-05

## 2025-04-15 NOTE — H&P PST ADULT - NS PRO LAST MENSTRUAL DATE
· On admission in November 2021 found to have positive wound culture and 1/2 + BC for Staph aureus  Treated with 4 weeks of IV vancomycin through 12/11/2021  · Hx of chronic wounds, chronic venous insufficiency, lymphedema  · +SIRS criteria  · On exam, bilateral lower extremities with edema L>R, weeping tan drainage, multiple open areas, erythema below knees     · Wound culture pending  · Blood cultures pending, continue Cefepime and Vancomycin given recent history   · Low suspicion for DVT, patient anticoagulated with warfarin   · Consider ID consult   · Consult wound RN, has VNA services for wound care at home ~4/1/2025

## 2025-04-15 NOTE — H&P PST ADULT - NSICDXPASTMEDICALHX_GEN_ALL_CORE_FT
PAST MEDICAL HISTORY:  Acid reflux     Amplified musculoskeletal pain syndrome     JUAN positive     Anemia     Anxiety     Arthralgia of both knees     Asthma     Bronchospasm acute    Constipation     Delayed gastric emptying     Dysuria     Gastroparesis     H/O fibromyalgia     H/O headache     History of chronic fatigue syndrome     History of nausea     IBS (irritable bowel syndrome)     Pelvic floor dysfunction     Rosacea     Severe anxiety

## 2025-04-15 NOTE — H&P PST ADULT - FUNCTIONAL STATUS
DASI/METS >5, limited by exercise intolerance and deconditioning resulting in tachycardia/4-10 METS DASI/METS >6, limited by exercise intolerance and deconditioning resulting in tachycardia and COULTER/4-10 METS

## 2025-04-15 NOTE — H&P PST ADULT - OTHER CARE PROVIDERS
CARD - Dr. Inocencio Lara [phone]; PULM Dr. Yon Danielle [phone]; GI- Dr. Mendosa [phone]

## 2025-04-15 NOTE — H&P PST ADULT - PROBLEM SELECTOR PLAN 1
Scheduled for cystoscopy and intradetrusor botulinumtoxina injection on 4/21/25  Presurgical instructions reviewed and provided  urine culture completed 4/1 in uro office  labs done: cbc, bmp  All questions answered.

## 2025-04-15 NOTE — H&P PST ADULT - PROBLEM SELECTOR PLAN 2
Asthma and post op bronchospasm being followed by Dr. Danielle who recommends 40 mg prednisone 4/20 PM and 40 mg prednisone in the AM on DOS Asthma and post op bronchospasm being managed by Dr. Danielle who recommends 40 mg prednisone 4/20 PM and 40 mg prednisone in the AM on DOS

## 2025-04-15 NOTE — H&P PST ADULT - RESPIRATORY
clear to auscultation bilaterally/no respiratory distress/breath sounds equal/good air movement/respirations non-labored/no intercostal retractions

## 2025-04-15 NOTE — H&P PST ADULT - ATTENDING COMMENTS
Patient's suprapubic catheter fell out last night. Discussed cystoscopy and insertion of suprapubic catheter. Risks discussed including bleeding, infection, damage to adjacent structures. She demonstrated good understanding. Discussed onabotulinumtoxinA injection of part of or all of her bladder. She prefers more complete chemodenervation.

## 2025-04-15 NOTE — H&P PST ADULT - NSICDXPASTSURGICALHX_GEN_ALL_CORE_FT
PAST SURGICAL HISTORY:  Gastrojejunal (GJ) tube in place     H/O hernia repair umbilical    History of suprapubic catheter     S/P biopsy face    S/P colonoscopy     S/P endoscopy     Status post anal fissurectomy

## 2025-04-15 NOTE — H&P PST ADULT - HISTORY OF PRESENT ILLNESS
24 year old female with PMH of chronic fatigue syndrome, IBS and delayed gastric emptying and gastroparesis (s/p GJ tube), tachycardia (on cardiezem), multiple drug allergy, Amplified musculoskeletal pain syndrome, Anxiety (ketamine infusions Q7M),  Asthma (with acute bronchospasms), Fibromyalgia, GERD, Iron deficiency anemia, migraines, hypotonic/underactive bladder (s/p suprapubic catheter), autism and a intellectual disability. She presents to [facility] for scheduled cystoscopy and intradetrusor botulinumtoxina injection on 4/21/2025 for continued dysuria and pelvic pain.             24 year old female with PMH of chronic fatigue syndrome, amplified musculoskeletal pain syndrome, fibromyalgia, IBS and delayed gastric emptying and gastroparesis (s/p GJ tube, dependant on tube feedings), tachycardia (on cardizem), multiple drug allergies,  anxiety (ketamine infusions Q7M),  Asthma (with acute bronchospasms), GERD, Iron deficiency anemia, migraines, hypotonic/underactive bladder (s/p suprapubic catheter), autism and intellectual disability. She presents to [facility] for scheduled cystoscopy and intradetrusor botulinumtoxina injection on 4/21/2025 for continued dysuria and pelvic pain. Overall, she feels well without fever, chill, SOB and CP. Reports constant pelvic pain and "internal" burning.

## 2025-04-16 RX ORDER — CEPHALEXIN 500 MG/1
500 CAPSULE ORAL
Qty: 6 | Refills: 0 | Status: ACTIVE | COMMUNITY
Start: 2025-04-16 | End: 1900-01-01

## 2025-04-17 DIAGNOSIS — R82.71 BACTERIURIA: ICD-10-CM

## 2025-04-17 RX ORDER — AMOXICILLIN 250 MG/5ML
250 POWDER, FOR SUSPENSION ORAL TWICE DAILY
Qty: 1 | Refills: 0 | Status: ACTIVE | COMMUNITY
Start: 2025-04-17 | End: 1900-01-01

## 2025-04-21 ENCOUNTER — APPOINTMENT (OUTPATIENT)
Dept: UROLOGY | Facility: HOSPITAL | Age: 25
End: 2025-04-21

## 2025-04-21 ENCOUNTER — OUTPATIENT (OUTPATIENT)
Dept: OUTPATIENT SERVICES | Facility: HOSPITAL | Age: 25
LOS: 1 days | End: 2025-04-21
Payer: COMMERCIAL

## 2025-04-21 ENCOUNTER — TRANSCRIPTION ENCOUNTER (OUTPATIENT)
Age: 25
End: 2025-04-21

## 2025-04-21 VITALS
OXYGEN SATURATION: 97 % | HEART RATE: 93 BPM | SYSTOLIC BLOOD PRESSURE: 118 MMHG | TEMPERATURE: 98 F | DIASTOLIC BLOOD PRESSURE: 75 MMHG | RESPIRATION RATE: 17 BRPM

## 2025-04-21 VITALS
TEMPERATURE: 98 F | DIASTOLIC BLOOD PRESSURE: 81 MMHG | SYSTOLIC BLOOD PRESSURE: 118 MMHG | RESPIRATION RATE: 18 BRPM | WEIGHT: 125 LBS | OXYGEN SATURATION: 98 % | HEART RATE: 110 BPM | HEIGHT: 64 IN

## 2025-04-21 DIAGNOSIS — Z98.890 OTHER SPECIFIED POSTPROCEDURAL STATES: Chronic | ICD-10-CM

## 2025-04-21 DIAGNOSIS — Z93.1 GASTROSTOMY STATUS: Chronic | ICD-10-CM

## 2025-04-21 DIAGNOSIS — R30.0 DYSURIA: ICD-10-CM

## 2025-04-21 LAB — HCG SERPL-ACNC: <2 MIU/ML — SIGNIFICANT CHANGE UP

## 2025-04-21 PROCEDURE — 87086 URINE CULTURE/COLONY COUNT: CPT

## 2025-04-21 PROCEDURE — 51102 DRAIN BL W/CATH INSERTION: CPT

## 2025-04-21 PROCEDURE — 36415 COLL VENOUS BLD VENIPUNCTURE: CPT

## 2025-04-21 PROCEDURE — 52287 CYSTOSCOPY CHEMODENERVATION: CPT

## 2025-04-21 PROCEDURE — 51705 CHANGE OF BLADDER TUBE: CPT

## 2025-04-21 PROCEDURE — C2627: CPT

## 2025-04-21 PROCEDURE — C9399: CPT

## 2025-04-21 PROCEDURE — C1726: CPT

## 2025-04-21 PROCEDURE — 84702 CHORIONIC GONADOTROPIN TEST: CPT

## 2025-04-21 PROCEDURE — C1769: CPT

## 2025-04-21 DEVICE — AMPLATZ RENAL DILATOR 6FR-30FR X 16CM: Type: IMPLANTABLE DEVICE | Status: FUNCTIONAL

## 2025-04-21 DEVICE — CATH INTRODUCER LAWERENCE SUPRA-FOLEY 16FR: Type: IMPLANTABLE DEVICE | Status: FUNCTIONAL

## 2025-04-21 DEVICE — GUIDEWIRE SENSOR DUAL-FLEX NITINOL STRAIGHT .035" X 150CM: Type: IMPLANTABLE DEVICE | Status: FUNCTIONAL

## 2025-04-21 RX ORDER — SODIUM CHLORIDE 9 G/1000ML
1000 INJECTION, SOLUTION INTRAVENOUS
Refills: 0 | Status: DISCONTINUED | OUTPATIENT
Start: 2025-04-21 | End: 2025-04-21

## 2025-04-21 RX ORDER — LIDOCAINE HCL/PF 10 MG/ML
5 VIAL (ML) INJECTION ONCE
Refills: 0 | Status: DISCONTINUED | OUTPATIENT
Start: 2025-04-21 | End: 2025-05-05

## 2025-04-21 RX ORDER — GABAPENTIN 400 MG/1
100 CAPSULE ORAL
Refills: 0 | DISCHARGE

## 2025-04-21 RX ORDER — ONDANSETRON HCL/PF 4 MG/2 ML
4 VIAL (ML) INJECTION ONCE
Refills: 0 | Status: DISCONTINUED | OUTPATIENT
Start: 2025-04-21 | End: 2025-04-21

## 2025-04-21 RX ORDER — FOSFOMYCIN TROMETHAMINE 3 G/1
1 GRANULE, FOR SOLUTION ORAL
Refills: 0 | DISCHARGE

## 2025-04-21 RX ORDER — HYDROMORPHONE/SOD CHLOR,ISO/PF 2 MG/10 ML
0.5 SYRINGE (ML) INJECTION
Refills: 0 | Status: DISCONTINUED | OUTPATIENT
Start: 2025-04-21 | End: 2025-04-21

## 2025-04-21 RX ORDER — TENAPANOR HYDROCHLORIDE 53.2 MG/1
1 TABLET ORAL
Refills: 0 | DISCHARGE

## 2025-04-21 RX ORDER — DILTIAZEM HYDROCHLORIDE 240 MG/1
1 TABLET, EXTENDED RELEASE ORAL
Refills: 0 | DISCHARGE

## 2025-04-21 RX ORDER — PHENAZOPYRIDINE HCL 100 MG
200 TABLET ORAL ONCE
Refills: 0 | Status: COMPLETED | OUTPATIENT
Start: 2025-04-21 | End: 2025-04-21

## 2025-04-21 RX ORDER — LINACLOTIDE 290 UG/1
1 CAPSULE, GELATIN COATED ORAL
Refills: 0 | DISCHARGE

## 2025-04-21 RX ORDER — TROSPIUM CHLORIDE 20 MG/1
1 TABLET, FILM COATED ORAL
Refills: 0 | DISCHARGE

## 2025-04-21 RX ORDER — DILTIAZEM HYDROCHLORIDE 120 MG/1
30 CAPSULE, EXTENDED RELEASE ORAL ONCE
Refills: 0 | Status: COMPLETED | OUTPATIENT
Start: 2025-04-21 | End: 2025-04-21

## 2025-04-21 RX ORDER — PREGABALIN 75 MG/1
1 CAPSULE ORAL
Refills: 0 | DISCHARGE

## 2025-04-21 RX ORDER — MAGNESIUM HYDROXIDE 400 MG/5ML
30 SUSPENSION ORAL
Refills: 0 | DISCHARGE

## 2025-04-21 RX ORDER — KETOROLAC TROMETHAMINE 30 MG/ML
1 INJECTION, SOLUTION INTRAMUSCULAR; INTRAVENOUS
Refills: 0 | DISCHARGE

## 2025-04-21 RX ORDER — LEVALBUTEROL HYDROCHLORIDE 1.25 MG/3ML
1 SOLUTION RESPIRATORY (INHALATION)
Refills: 0 | DISCHARGE

## 2025-04-21 RX ADMIN — DILTIAZEM HYDROCHLORIDE 30 MILLIGRAM(S): 120 CAPSULE, EXTENDED RELEASE ORAL at 12:11

## 2025-04-21 RX ADMIN — Medication 0.5 MILLIGRAM(S): at 15:40

## 2025-04-21 RX ADMIN — Medication 200 MILLIGRAM(S): at 16:34

## 2025-04-21 RX ADMIN — SODIUM CHLORIDE 75 MILLILITER(S): 9 INJECTION, SOLUTION INTRAVENOUS at 12:48

## 2025-04-21 NOTE — ASU PATIENT PROFILE, ADULT - FALL HARM RISK - RISK INTERVENTIONS

## 2025-04-21 NOTE — ASU PATIENT PROFILE, ADULT - PRESSURE ULCER(S)
[address] [facility] to Those in Need  [phone]    Patient Name: Juan J Hsieh  YOB: 1960    Date of Provider Hospice Visit: 06/28/22    Level of Care:   [x] General Inpatient (GIP)    [] Routine   [] Respite    Current Location of Care:  [] [facility] [] [facility] [] [facility] [] [facility] [x] [facility], patient referred from:  [] [facility] [] [facility] [] [facility] [] [facility] [x] Home [] Other:     Principle Hospice Diagnosis: Metastatic breast cancer  Diagnoses RELATED to the terminal prognosis: Cancer associated pain, history of rectal impaction of stool  Other Diagnoses:      HOSPICE SUMMARY     Juan J Hsieh is a 57y.o. year old who was admitted to [facility]. Patient is a 79-year-old female who presents to the hospice house for GIP level care secondary to unmanaged pain in the home setting despite MS Contin and Dilaudid 6 mg every 3 hours. Patient unfortunately has had intractable nausea and vomiting despite attempts at managing in the home setting with Haldol and Zofran. Patient typically takes MS Contin 30 mg twice a day along with the Dilaudid. Patient's daughter is at the bedside. Patient has not been really able to eat or drink and clearly showing evidence of further decline compared to when I saw her approximately 1 month ago at the hospice [place]. The patient's principle diagnosis has resulted in intractable pain and nausea and vomiting  Refer to LCD     Functionally, the patient's Karnofsky and/or Palliative Performance Scale has declined over a period of weeks and is estimated at 10 the patient is dependent on the following ADLs: All    Objective information that support this patients limited prognosis includes:   See above note    The patient/family chose comfort measures with the support of Hospice. HOSPICE DIAGNOSES   Active Symptoms:  1. Cancer associated pain  2.   Intractable nausea and vomiting-palpable mass in the left lower to mid quadrant-questionable stool versus tumor progression  3. Large left fungating breast mass  4. Hospice care  5. Restlessness  6. Unresponsiveness  7. Irregular breathing pattern       PLAN   1. Continue [facility] level care as she needs frequent nursing assessment, IV/subcutaneous medication, not safe to attempt oral medication which was not effective in the home setting and now also having irregular breathing pattern. Patient with significant increase in symptom burden overnight with multiple adjustments made last night and already this morning. Patient showing evidence of moaning, nonverbal signs of discomfort which is definitely different from yesterday afternoon. 2. We have now changed to Dilaudid to 10 mg every 2 hours scheduled every 15 minutes as needed for pain and shortness of breath. Would consider a PCA but I am concerned that her peripheral IV may not be able to tolerate for long period  3. Ativan 4 mg IV every 2 hours scheduled every 15 minutes as needed  4. Comfort meds for all other symptoms  5. Plan reviewed with bedside nursing team  6. No family at the bedside this morning. We will update sister and daughter. I was able to talk with patient's sister at the bedside yesterday afternoon. 7.  and SW to support family needs  8. Disposition: Anticipate death at the hospice house  9. Hospice Plan of care was reviewed in detail and agree with current plan of care    Prognosis estimated based on 06/28/22 clinical assessment is:   [x] Hours to Days    [] Days to Weeks    [] Other:    Communicated plan of care with: Hospice Case Manager; Hospice IDT; Care Team     GOALS OF CARE     Patient/Medical POA stated Goal of Care: Hospice care    [x] I have reviewed and/or updated ACP information in the Advance Care Planning Navigator. This information is available in the [address] link in the patient's chart header.     Primary Decision Maker (Health Care Agent):   Primary Decision Maker: Anna Bautista - Daughter - [phone]    Resuscitation Status: DNR  If DNR is there a Durable DNR on file? : [x] Yes [] No (If no, complete Durable DNR)    HISTORY     History obtained from: Chart, daughter, patient, home team    CHIEF COMPLAINT: Pain and nausea and vomiting  The patient is:   [x] Verbal  [] Nonverbal  [] Unresponsive    HPI/SUBJECTIVE: Patient is able to whisper a few things. She clearly is uncomfortable with moaning, grimacing, nausea. Patient very ashen    6/26: patient does not wake. Nursing expresses concern that patient may be more comfortable if we decrease dosing interval.    6/27-patient is unresponsive. Does appear comfortable right now with adjustment medication made yesterday. 6/28-patient is unresponsive but definitely showing more evidence of grimacing, moaning that is definitely different from yesterday.      REVIEW OF SYSTEMS     The following systems were: [] reviewed  [x] unable to be reviewed    Positive ROS include:  Constitutional: fatigue, weakness, in pain, short of breath  Ears/nose/mouth/throat: increased airway secretions  Respiratory:shortness of breath, wheezing  Gastrointestinal:poor appetite, nausea, vomiting, abdominal pain, constipation, diarrhea  Musculoskeletal:pain, deformities, swelling legs  Neurologic:confusion, hallucinations, weakness  Psychiatric:anxiety, feeling depressed, poor sleep  Endocrine:     Adult Non-Verbal Pain Assessment Score: 5    Face  [] 0   No particular expression or smile  [] 1   Occasional grimace, tearing, frowning, wrinkled forehead  [x] 2   Frequent grimace, tearing, frowning, wrinkled forehead    Activity (movement)  [] 0   Lying quietly, normal position  [] 1   Seeking attention through movement or slow, cautious movement  [x] 2   Restless, excessive activity and/or withdrawal reflexes    Guarding  [x] 0   Lying quietly, no positioning of hands over areas of body  [] 1   Splinting areas of the body, tense  [] 2   Rigid, stiff    Physiology (vital signs)  [x] 0   Stable vital signs  [] 1   Change in any of the following: SBP > 20mm Hg; HR > 20/minute  [] 2   Change in any of the following: SBP > 30mm Hg; HR > 25/minute    Respiratory  [] 0   Baseline RR/SpO2, compliant with ventilator  [x] 1   RR > 10 above baseline, or 5% drop SpO2, mild asynchrony with ventilator  [] 2   RR > 20 above baseline, or 10% drop SpO2, asynchrony with ventilator     FUNCTIONAL ASSESSMENT     Palliative Performance Scale (PPS): 10       PSYCHOSOCIAL/SPIRITUAL ASSESSMENT     Active Problems:    * No active hospital problems.  *    Past Medical History:   Diagnosis Date    Breast cancer ([facility] [address]     left side    Menopause       Past Surgical History:   Procedure Laterality Date    HX BREAST BIOPSY Left     x2    HX BREAST LUMPECTOMY Left 2019    LEFT BREAST LUMPECTOMY WITH ULTRASOUND performed by Joey Cyr MD at [facility] AMBULATORY OR    HX HYSTERECTOMY      partial, ovaries remain    IR INSERT TUNL CVC W PORT OVER 5 YEARS  2021    IR REMOVE TUNL CVAD W PORT/PUMP  3/30/2022    VASCULAR SURGERY PROCEDURE UNLIST      portacath      Social History     Tobacco Use    Smoking status: Former Smoker     Packs/day: 0.10     Quit date: 2020     Years since quittin.4    Smokeless tobacco: Never Used   Substance Use Topics    Alcohol use: No     Family History   Problem Relation Age of Onset    Cancer Father     Cancer Maternal Aunt     Breast Cancer Maternal Aunt         4 paternal aunts    Cancer Maternal Uncle     Cancer Paternal Aunt     Cancer Paternal Uncle     Breast Cancer Maternal Grandmother       Allergies   Allergen Reactions    Codeine Rash     Rash from tylenol #3      Current Facility-Administered Medications   Medication Dose Route Frequency    LORazepam (ATIVAN) injection 4 mg  4 mg IntraVENous Q15MIN PRN    LORazepam (ATIVAN) injection 4 mg  4 mg IntraVENous Q2H    HYDROmorphone (DILAUDID) injection 10 mg  10 mg IntraVENous Q2H    HYDROmorphone (DILAUDID) injection 10 mg  10 mg IntraVENous Q15MIN PRN    bisacodyL (DULCOLAX) suppository 10 mg  10 mg Rectal DAILY PRN    glycopyrrolate (ROBINUL) injection 0.2 mg  0.2 mg IntraVENous Q4H PRN    acetaminophen (TYLENOL) suppository 650 mg  650 mg Rectal Q4H PRN    ketorolac (TORADOL) injection 30 mg  30 mg IntraVENous Q6H PRN    prochlorperazine (COMPAZINE) injection 10 mg  10 mg IntraVENous Q6H PRN    ondansetron (ZOFRAN) injection 4 mg  4 mg IntraVENous Q6H PRN        PHYSICAL EXAM     Wt Readings from Last 3 Encounters:   04/13/22 73.9 kg (163 lb)   03/31/22 83.1 kg (183 lb 1.6 oz)   12/13/21 79.6 kg (175 lb 8 oz)       Visit Vitals  BP (!) 60/39 (BP 1 Location: Right lower arm)   Pulse 93   Temp 98.7 °F (37.1 °C)   Resp 20   SpO2 (!) 77%       Supplemental O2  [] Yes  [x] NO  Last bowel movement:     Currently this patient has:  [x] Peripheral IV [] PICC  [] PORT [] ICD    [] Watson Catheter [] NG Tube   [] PEG Tube    [] Rectal Tube [] Drain  [] Other: SC    Constitutional: Ill-appearing,  ashen, grimacing, moaning, slightly restless  Eyes: closed, lids normal  ENMT: Dry mm, nares normal  Cardiovascular: Tachycardic, pulses palpable  Respiratory: shallow breathing, slight increased work of breathing, slight accessory muscle use  Gastrointestinal: Soft  Musculoskeletal: Muscle wasting  Skin: warm, dry, Left breast fungating mass that encompasses the whole left chest, left axillary region  Neurologic: does not wake or stir to exam  Psychiatric: Slightly restless  Other:       Pertinent Lab and or Imaging Tests:  Lab Results   Component Value Date/Time    Sodium 137 04/06/2022 04:08 AM    Potassium 3.8 04/06/2022 04:08 AM    Chloride 105 04/06/2022 04:08 AM    CO2 25 04/06/2022 04:08 AM    Anion gap 7 04/06/2022 04:08 AM    Glucose 69 04/06/2022 04:08 AM    BUN 12 04/06/2022 04:08 AM    Creatinine 0.47 (L) 04/06/2022 04:08 AM    BUN/Creatinine ratio 26 (H) 04/06/2022 04:08 AM    GFR est AA >60 04/06/2022 04:08 AM    GFR est non-AA >60 04/06/2022 04:08 AM    Calcium 8.0 (L) 04/06/2022 04:08 AM     Lab Results   Component Value Date/Time    Protein, total 5.9 (L) 03/25/2022 12:57 PM    Albumin 2.1 (L) 03/25/2022 12:57 PM           Total time:   Counseling / coordination time:   > 50% counseling / coordination?: no

## 2025-04-21 NOTE — ASU DISCHARGE PLAN (ADULT/PEDIATRIC) - FINANCIAL ASSISTANCE
[facility] provides services at a reduced cost to those who are determined to be eligible through [facility]’s financial assistance program. Information regarding [facility]’s financial assistance program can be found by going to [email or web address] or by calling [phone].

## 2025-04-21 NOTE — BRIEF OPERATIVE NOTE - NSICDXBRIEFPOSTOP_GEN_ALL_CORE_FT
POST-OP DIAGNOSIS:  Interstitial cystitis 21-Apr-2025 15:28:51  Elza Dietz  Urinary urgency 21-Apr-2025 15:29:03  Elza Dietz  Decreased bladder capacity 21-Apr-2025 15:29:11  Elza Dietz

## 2025-04-21 NOTE — ASU DISCHARGE PLAN (ADULT/PEDIATRIC) - ASU DC SPECIAL INSTRUCTIONSFT
Call the office if you have fever greater than 101, difficulty urinating, pain not relieved with pain medication, nausea/vomiting.

## 2025-04-21 NOTE — BRIEF OPERATIVE NOTE - NSICDXBRIEFPREOP_GEN_ALL_CORE_FT
PRE-OP DIAGNOSIS:  Interstitial cystitis 21-Apr-2025 15:28:22  Elza Dietz  Urinary urgency 21-Apr-2025 15:28:27  Elza Dietz  Decreased bladder capacity 21-Apr-2025 15:28:40  Elza Dietz

## 2025-04-21 NOTE — BRIEF OPERATIVE NOTE - NSICDXBRIEFPROCEDURE_GEN_ALL_CORE_FT
PROCEDURES:  Cystoscopy with injection of Botox into bladder 21-Apr-2025 15:27:51  Elza Dietz  Suprapubic tube placement 21-Apr-2025 15:28:08  Elza Dietz

## 2025-04-21 NOTE — BRIEF OPERATIVE NOTE - OPERATION/FINDINGS
1. Normal appearing external genitalia  2. Normal appearing bladder mucosa without lesion, mass or injury. Efflux of urine noted from bilateral ureteral orifices. 100u of botox injected

## 2025-04-22 LAB
CULTURE RESULTS: NO GROWTH — SIGNIFICANT CHANGE UP
SPECIMEN SOURCE: SIGNIFICANT CHANGE UP

## 2025-04-23 ENCOUNTER — NON-APPOINTMENT (OUTPATIENT)
Age: 25
End: 2025-04-23

## 2025-04-23 DIAGNOSIS — R30.0 DYSURIA: ICD-10-CM

## 2025-04-23 DIAGNOSIS — N30.10 INTERSTITIAL CYSTITIS (CHRONIC) W/OUT HEMATURIA: ICD-10-CM

## 2025-04-23 RX ORDER — LIDOCAINE HYDROCHLORIDE 20 MG/ML
2 JELLY TOPICAL
Qty: 1 | Refills: 3 | Status: ACTIVE | COMMUNITY
Start: 2025-04-23 | End: 1900-01-01

## 2025-04-23 RX ORDER — LIDOCAINE HYDROCHLORIDE 10 MG/ML
1 INJECTION, SOLUTION INFILTRATION ONCE
Qty: 1 | Refills: 0 | Status: ACTIVE | OUTPATIENT
Start: 2025-04-23

## 2025-04-24 ENCOUNTER — NON-APPOINTMENT (OUTPATIENT)
Age: 25
End: 2025-04-24

## 2025-04-24 ENCOUNTER — APPOINTMENT (OUTPATIENT)
Dept: UROLOGY | Facility: CLINIC | Age: 25
End: 2025-04-24

## 2025-04-30 ENCOUNTER — APPOINTMENT (OUTPATIENT)
Dept: UROLOGY | Facility: CLINIC | Age: 25
End: 2025-04-30

## 2025-05-20 ENCOUNTER — APPOINTMENT (OUTPATIENT)
Dept: UROLOGY | Facility: CLINIC | Age: 25
End: 2025-05-20

## 2025-05-20 ENCOUNTER — OUTPATIENT (OUTPATIENT)
Dept: OUTPATIENT SERVICES | Facility: HOSPITAL | Age: 25
LOS: 1 days | End: 2025-05-20

## 2025-05-20 VITALS — DIASTOLIC BLOOD PRESSURE: 77 MMHG | SYSTOLIC BLOOD PRESSURE: 120 MMHG | RESPIRATION RATE: 18 BRPM | HEART RATE: 84 BPM

## 2025-05-20 DIAGNOSIS — Z98.890 OTHER SPECIFIED POSTPROCEDURAL STATES: Chronic | ICD-10-CM

## 2025-05-20 DIAGNOSIS — Z93.1 GASTROSTOMY STATUS: Chronic | ICD-10-CM

## 2025-05-20 DIAGNOSIS — R35.0 FREQUENCY OF MICTURITION: ICD-10-CM

## 2025-05-20 PROCEDURE — 51705 CHANGE OF BLADDER TUBE: CPT

## 2025-05-20 PROCEDURE — C2627: CPT

## 2025-05-20 RX ORDER — KETOROLAC TROMETHAMINE 10 MG/1
10 TABLET, FILM COATED ORAL 3 TIMES DAILY
Qty: 30 | Refills: 0 | Status: ACTIVE | COMMUNITY
Start: 2025-05-20 | End: 1900-01-01

## 2025-06-18 ENCOUNTER — OUTPATIENT (OUTPATIENT)
Dept: OUTPATIENT SERVICES | Facility: HOSPITAL | Age: 25
LOS: 1 days | End: 2025-06-18
Payer: COMMERCIAL

## 2025-06-18 ENCOUNTER — APPOINTMENT (OUTPATIENT)
Dept: UROLOGY | Facility: CLINIC | Age: 25
End: 2025-06-18

## 2025-06-18 VITALS
HEART RATE: 69 BPM | RESPIRATION RATE: 15 BRPM | OXYGEN SATURATION: 100 % | SYSTOLIC BLOOD PRESSURE: 111 MMHG | DIASTOLIC BLOOD PRESSURE: 79 MMHG

## 2025-06-18 DIAGNOSIS — R35.0 FREQUENCY OF MICTURITION: ICD-10-CM

## 2025-06-18 DIAGNOSIS — Z98.890 OTHER SPECIFIED POSTPROCEDURAL STATES: Chronic | ICD-10-CM

## 2025-06-18 DIAGNOSIS — Z93.1 GASTROSTOMY STATUS: Chronic | ICD-10-CM

## 2025-06-18 PROCEDURE — 99213 OFFICE O/P EST LOW 20 MIN: CPT | Mod: 25

## 2025-06-18 PROCEDURE — 51705 CHANGE OF BLADDER TUBE: CPT

## 2025-06-18 PROCEDURE — C2627: CPT

## 2025-06-21 ENCOUNTER — NON-APPOINTMENT (OUTPATIENT)
Age: 25
End: 2025-06-21

## 2025-06-24 DIAGNOSIS — N39.0 URINARY TRACT INFECTION, SITE NOT SPECIFIED: ICD-10-CM

## 2025-06-24 DIAGNOSIS — N30.10 INTERSTITIAL CYSTITIS (CHRONIC) WITHOUT HEMATURIA: ICD-10-CM

## 2025-06-24 DIAGNOSIS — R33.9 RETENTION OF URINE, UNSPECIFIED: ICD-10-CM

## 2025-06-25 LAB — BACTERIA UR CULT: ABNORMAL

## 2025-07-16 ENCOUNTER — OUTPATIENT (OUTPATIENT)
Dept: OUTPATIENT SERVICES | Facility: HOSPITAL | Age: 25
LOS: 1 days | End: 2025-07-16
Payer: COMMERCIAL

## 2025-07-16 ENCOUNTER — APPOINTMENT (OUTPATIENT)
Dept: UROLOGY | Facility: CLINIC | Age: 25
End: 2025-07-16
Payer: COMMERCIAL

## 2025-07-16 VITALS
HEIGHT: 63 IN | WEIGHT: 115 LBS | SYSTOLIC BLOOD PRESSURE: 116 MMHG | HEART RATE: 70 BPM | DIASTOLIC BLOOD PRESSURE: 74 MMHG | OXYGEN SATURATION: 98 % | RESPIRATION RATE: 15 BRPM | BODY MASS INDEX: 20.38 KG/M2

## 2025-07-16 DIAGNOSIS — Z98.890 OTHER SPECIFIED POSTPROCEDURAL STATES: Chronic | ICD-10-CM

## 2025-07-16 DIAGNOSIS — R35.0 FREQUENCY OF MICTURITION: ICD-10-CM

## 2025-07-16 DIAGNOSIS — Z93.1 GASTROSTOMY STATUS: Chronic | ICD-10-CM

## 2025-07-16 PROCEDURE — 51705 CHANGE OF BLADDER TUBE: CPT

## 2025-07-16 PROCEDURE — C2627: CPT

## 2025-07-16 PROCEDURE — 99213 OFFICE O/P EST LOW 20 MIN: CPT | Mod: 25

## 2025-07-17 LAB
APPEARANCE: CLEAR
BACTERIA: ABNORMAL /HPF
BILIRUBIN URINE: NEGATIVE
BLOOD URINE: NEGATIVE
CAST: 0 /LPF
COLOR: NORMAL
EPITHELIAL CELLS: 1 /HPF
GLUCOSE QUALITATIVE U: NEGATIVE MG/DL
KETONES URINE: NEGATIVE MG/DL
LEUKOCYTE ESTERASE URINE: ABNORMAL
MICROSCOPIC-UA: NORMAL
NITRITE URINE: POSITIVE
PH URINE: 6
PROTEIN URINE: NEGATIVE MG/DL
RED BLOOD CELLS URINE: 0 /HPF
SPECIFIC GRAVITY URINE: 1.01
UROBILINOGEN URINE: 1 MG/DL
WHITE BLOOD CELLS URINE: 12 /HPF

## 2025-07-18 ENCOUNTER — NON-APPOINTMENT (OUTPATIENT)
Age: 25
End: 2025-07-18

## 2025-07-21 LAB — BACTERIA UR CULT: NORMAL

## 2025-07-24 DIAGNOSIS — R33.9 RETENTION OF URINE, UNSPECIFIED: ICD-10-CM

## 2025-07-24 DIAGNOSIS — N30.10 INTERSTITIAL CYSTITIS (CHRONIC) WITHOUT HEMATURIA: ICD-10-CM

## 2025-07-24 DIAGNOSIS — M62.89 OTHER SPECIFIED DISORDERS OF MUSCLE: ICD-10-CM

## 2025-07-28 ENCOUNTER — NON-APPOINTMENT (OUTPATIENT)
Age: 25
End: 2025-07-28

## 2025-07-30 ENCOUNTER — APPOINTMENT (OUTPATIENT)
Dept: UROLOGY | Facility: CLINIC | Age: 25
End: 2025-07-30
Payer: COMMERCIAL

## 2025-07-30 DIAGNOSIS — N30.10 INTERSTITIAL CYSTITIS (CHRONIC) W/OUT HEMATURIA: ICD-10-CM

## 2025-07-30 DIAGNOSIS — N94.819 VULVODYNIA, UNSPECIFIED: ICD-10-CM

## 2025-07-30 DIAGNOSIS — R33.9 RETENTION OF URINE, UNSPECIFIED: ICD-10-CM

## 2025-07-30 DIAGNOSIS — G89.4 CHRONIC PAIN SYNDROME: ICD-10-CM

## 2025-07-30 DIAGNOSIS — K59.09 OTHER CONSTIPATION: ICD-10-CM

## 2025-07-30 DIAGNOSIS — K31.84 GASTROPARESIS: ICD-10-CM

## 2025-07-30 PROCEDURE — 99215 OFFICE O/P EST HI 40 MIN: CPT

## 2025-07-30 PROCEDURE — 99459 PELVIC EXAMINATION: CPT

## 2025-08-13 ENCOUNTER — APPOINTMENT (OUTPATIENT)
Dept: UROLOGY | Facility: CLINIC | Age: 25
End: 2025-08-13
Payer: COMMERCIAL

## 2025-08-13 ENCOUNTER — OUTPATIENT (OUTPATIENT)
Dept: OUTPATIENT SERVICES | Facility: HOSPITAL | Age: 25
LOS: 1 days | End: 2025-08-13
Payer: COMMERCIAL

## 2025-08-13 VITALS
RESPIRATION RATE: 16 BRPM | TEMPERATURE: 97.5 F | DIASTOLIC BLOOD PRESSURE: 69 MMHG | HEART RATE: 61 BPM | SYSTOLIC BLOOD PRESSURE: 105 MMHG | OXYGEN SATURATION: 100 %

## 2025-08-13 DIAGNOSIS — Z98.890 OTHER SPECIFIED POSTPROCEDURAL STATES: Chronic | ICD-10-CM

## 2025-08-13 DIAGNOSIS — R35.0 FREQUENCY OF MICTURITION: ICD-10-CM

## 2025-08-13 DIAGNOSIS — Z93.1 GASTROSTOMY STATUS: Chronic | ICD-10-CM

## 2025-08-13 PROCEDURE — 51705 CHANGE OF BLADDER TUBE: CPT

## 2025-08-13 PROCEDURE — 99214 OFFICE O/P EST MOD 30 MIN: CPT | Mod: 25

## 2025-08-13 PROCEDURE — C2627: CPT

## 2025-08-19 DIAGNOSIS — R33.9 RETENTION OF URINE, UNSPECIFIED: ICD-10-CM

## 2025-08-19 DIAGNOSIS — N30.10 INTERSTITIAL CYSTITIS (CHRONIC) WITHOUT HEMATURIA: ICD-10-CM

## 2025-08-19 DIAGNOSIS — M62.89 OTHER SPECIFIED DISORDERS OF MUSCLE: ICD-10-CM

## 2025-09-10 ENCOUNTER — APPOINTMENT (OUTPATIENT)
Dept: UROLOGY | Facility: CLINIC | Age: 25
End: 2025-09-10

## 2025-09-10 ENCOUNTER — APPOINTMENT (OUTPATIENT)
Dept: UROLOGY | Facility: CLINIC | Age: 25
End: 2025-09-10
Payer: COMMERCIAL

## 2025-09-10 VITALS — HEART RATE: 60 BPM | DIASTOLIC BLOOD PRESSURE: 69 MMHG | RESPIRATION RATE: 16 BRPM | SYSTOLIC BLOOD PRESSURE: 106 MMHG

## 2025-09-10 PROCEDURE — 51705 CHANGE OF BLADDER TUBE: CPT

## 2025-09-10 PROCEDURE — 99213 OFFICE O/P EST LOW 20 MIN: CPT | Mod: 25

## (undated) DEVICE — SOL IRR POUR NS 0.9% 500ML

## (undated) DEVICE — SLV COMPRESSION KNEE MED

## (undated) DEVICE — NDL LABORIE INJETAK ADJUSTABLE TIP 35CM

## (undated) DEVICE — PACK COLO RECTAL

## (undated) DEVICE — DRAPE LINGEMAN TUR

## (undated) DEVICE — VESSEL LOOP MINI-BLUE 0.075" X 16"

## (undated) DEVICE — FOLEY HOLDER STATLOCK 2 WAY ADULT

## (undated) DEVICE — GLV 7.5 PROTEXIS (WHITE)

## (undated) DEVICE — GLV 7 PROTEXIS (WHITE)

## (undated) DEVICE — GLV 6.5 PROTEXIS (WHITE)

## (undated) DEVICE — WARMING BLANKET UPPER ADULT

## (undated) DEVICE — VESSEL LOOP MAXI-BLUE 0.120" X 16"

## (undated) DEVICE — VENODYNE/SCD SLEEVE CALF MEDIUM

## (undated) DEVICE — POSITIONER FOAM EGG CRATE ULNAR 2PCS (PINK)

## (undated) DEVICE — SUT VICRYL 3-0 18" SH (POP-OFF)

## (undated) DEVICE — SUT VICRYL 2-0 27" SH

## (undated) DEVICE — SPECIMEN CONTAINER 4OZ

## (undated) DEVICE — SUT CHROMIC 2-0 27" CT-2

## (undated) DEVICE — DRSG MASTISOL

## (undated) DEVICE — TAPE SILK 3"

## (undated) DEVICE — PACK GENERAL MINOR

## (undated) DEVICE — GLV 8 PROTEXIS (WHITE)

## (undated) DEVICE — TUBING THERMADX UROLOGY

## (undated) DEVICE — LUBRICATING JELLY ONESHOT 1.25OZ

## (undated) DEVICE — BLADE SURGICAL #11 CARBON

## (undated) DEVICE — POSITIONER FOAM HEADREST (PINK)

## (undated) DEVICE — SOL IRR POUR H2O 250ML

## (undated) DEVICE — PACK PERI GYN

## (undated) DEVICE — POSITIONER STRAP KNEE & BODY 3X60" DISP

## (undated) DEVICE — SUT VICRYL 3-0 18" SH UNDYED (POP-OFF)

## (undated) DEVICE — DRSG CURITY GAUZE SPONGE 4 X 8" 12-PLY NON-STERILE

## (undated) DEVICE — PREP BETADINE KIT

## (undated) DEVICE — SOL IRR BAG H2O 3000ML

## (undated) DEVICE — GOWN TRIMAX LG

## (undated) DEVICE — SPECIMEN CONTAINER 100ML

## (undated) DEVICE — SOL IRR BAG NS 0.9% 3000ML

## (undated) DEVICE — FOLEY CATH 2-WAY 14FR 5CC SILICONE

## (undated) DEVICE — PACK CYSTO

## (undated) DEVICE — ACMI SELF-SEALING SEAL UP TO 7FR

## (undated) DEVICE — ELCTR BOVIE PENCIL SMOKE EVACUATION

## (undated) DEVICE — FOLEY TRAY 16FR 5CC LTX UMETER CLOSED